# Patient Record
Sex: MALE | Race: WHITE | NOT HISPANIC OR LATINO | Employment: OTHER | URBAN - METROPOLITAN AREA
[De-identification: names, ages, dates, MRNs, and addresses within clinical notes are randomized per-mention and may not be internally consistent; named-entity substitution may affect disease eponyms.]

---

## 2017-02-02 ENCOUNTER — OFFICE VISIT (OUTPATIENT)
Dept: FAMILY MEDICINE | Facility: OTHER | Age: 22
End: 2017-02-02
Attending: NURSE PRACTITIONER
Payer: COMMERCIAL

## 2017-02-02 VITALS
RESPIRATION RATE: 14 BRPM | WEIGHT: 158 LBS | BODY MASS INDEX: 19.87 KG/M2 | HEART RATE: 72 BPM | TEMPERATURE: 98.2 F | SYSTOLIC BLOOD PRESSURE: 98 MMHG | DIASTOLIC BLOOD PRESSURE: 58 MMHG

## 2017-02-02 DIAGNOSIS — J03.90 TONSILLITIS: ICD-10-CM

## 2017-02-02 DIAGNOSIS — R07.0 THROAT PAIN: Primary | ICD-10-CM

## 2017-02-02 LAB
DEPRECATED S PYO AG THROAT QL EIA: NORMAL
MICRO REPORT STATUS: NORMAL
SPECIMEN SOURCE: NORMAL

## 2017-02-02 PROCEDURE — 87081 CULTURE SCREEN ONLY: CPT | Performed by: NURSE PRACTITIONER

## 2017-02-02 PROCEDURE — 99213 OFFICE O/P EST LOW 20 MIN: CPT | Performed by: NURSE PRACTITIONER

## 2017-02-02 PROCEDURE — 87880 STREP A ASSAY W/OPTIC: CPT | Performed by: NURSE PRACTITIONER

## 2017-02-02 RX ORDER — AZITHROMYCIN 250 MG/1
TABLET, FILM COATED ORAL
Qty: 6 TABLET | Refills: 0 | Status: SHIPPED | OUTPATIENT
Start: 2017-02-02 | End: 2018-06-08

## 2017-02-02 NOTE — MR AVS SNAPSHOT
"              After Visit Summary   2/2/2017    Kulwant Santillan    MRN: [id number]           Patient Information     Date Of Birth          1995        Visit Information        Provider Department      2/2/2017 9:30 AM Marina Stuart NP [facility] ([facility]        Today's Diagnoses     Throat pain    -  1     Tonsillitis           Care Instructions      ASSESSMENT/PLAN:  1. Throat pain  symptomatic  - Rapid strep screen - negative  - Beta strep group A culture - pending    2. Tonsillitis  symptomatic  - azithromycin (ZITHROMAX) 250 MG tablet; Two tablets first day, then one tablet daily for four days.  Dispense: 6 tablet; Refill: 0      Use acetaminophen, ibuprofen, Robitussin DM, claritin or zyrtec   Increase fluids and rest.   Follow up if symptoms do not improve or worsen    Marina Stuart,   Certified Adult Nurse Practitioner  [phone]            Follow-ups after your visit        Who to contact     If you have questions or need follow up information about today's clinic visit or your schedule please contact [facility] directly at [phone].  Normal or non-critical lab and imaging results will be communicated to you by [employer], letter or phone within 4 business days after the clinic has received the results. If you do not hear from us within 7 days, please contact the clinic through [employer] or phone. If you have a critical or abnormal lab result, we will notify you by phone as soon as possible.  Submit refill requests through [employer] or call your pharmacy and they will forward the refill request to us. Please allow 3 business days for your refill to be completed.          Additional Information About Your Visit        MyChart Information     [employer] lets you send messages to your doctor, view your test results, renew your prescriptions, schedule appointments and more. To sign up, go to [email or web address] . Click on \"Log in\" on the left side of the screen, which " "will take you to the Welcome page. Then click on \"Sign up Now\" on the right side of the page.     You will be asked to enter the access code listed below, as well as some personal information. Please follow the directions to create your username and password.     Your access code is: [email or web address]  Expires: 5/3/2017  9:38 AM     Your access code will  in 90 days. If you need help or a new code, please call your [facility] or [phone].        Care EveryWhere ID     This is your Care EveryWhere ID. This could be used by other organizations to access your [place] medical records  [id number]        Your Vitals Were     Pulse Temperature Respirations             72 98.2  F (36.8  C) 14          Blood Pressure from Last 3 Encounters:   17 98/58   16 82/53   02/20/15 110/70    Weight from Last 3 Encounters:   17 158 lb (71.668 kg)   16 160 lb (72.576 kg) (57.59 %*)   02/20/15 145 lb (65.772 kg) (41.50 %*)     * Growth percentiles are based on [facility] 2-20 Years data.              We Performed the Following     Beta strep group A culture     Rapid strep screen          Today's Medication Changes          These changes are accurate as of: 17  9:38 AM.  If you have any questions, ask your nurse or doctor.               Start taking these medicines.        Dose/Directions    azithromycin 250 MG tablet   Commonly known as:  ZITHROMAX   Used for:  Tonsillitis   Started by:  Marina Stuart NP        Two tablets first day, then one tablet daily for four days.   Quantity:  6 tablet   Refills:  0            Where to get your medicines      These medications were sent to [id number] - [place], [place] - [address] AT [facility] OF  [address], [place] [address]     Phone:  [phone]    - azithromycin 250 MG tablet             Primary Care Provider Office Phone # Fax #    Marina Stuart [phone]       " [facility] [address] [place] [address]        Thank you!     Thank you for choosing [facility]  for your care. Our goal is always to provide you with excellent care. Hearing back from our patients is one way we can continue to improve our services. Please take a few minutes to complete the written survey that you may receive in the mail after your visit with us. Thank you!             Your Updated Medication List - Protect others around you: Learn how to safely use, store and throw away your medicines at www.disposemymeds.org.          This list is accurate as of: 2/2/17  9:38 AM.  Always use your most recent med list.                   Brand Name Dispense Instructions for use    azithromycin 250 MG tablet    ZITHROMAX    6 tablet    Two tablets first day, then one tablet daily for four days.       doxycycline 100 MG tablet    VIBRA-TABS    180 tablet    Take 1 tablet (100 mg) by mouth 2 times daily       ibuprofen 200 MG tablet    ADVIL/MOTRIN     Take 200 mg by mouth every 6 hours as needed

## 2017-02-02 NOTE — PATIENT INSTRUCTIONS
ASSESSMENT/PLAN:  1. Throat pain  symptomatic  - Rapid strep screen - negative  - Beta strep group A culture - pending    2. Tonsillitis  symptomatic  - azithromycin (ZITHROMAX) 250 MG tablet; Two tablets first day, then one tablet daily for four days.  Dispense: 6 tablet; Refill: 0      Use acetaminophen, ibuprofen, Robitussin DM, claritin or zyrtec   Increase fluids and rest.   Follow up if symptoms do not improve or worsen    Marina Stuart,   Certified Adult Nurse Practitioner  [phone]

## 2017-02-02 NOTE — PROGRESS NOTES
CHIEF COMPLAINT:  Chief Complaint   Patient presents with     Sinus Problem     SINUS PRESSURE, POST NASAL DRAINAGE, SORE THROAT, EARS HURT FOR 5 DAYS       SUBJECTIVE:   Kulwant Santillan  is here today because of:Sore Throat  The patient has had symptoms of cough, earache, sore throat, nasal congestion/runny nose, facial pressure, chest congestion and myalgias.   Onset of symptoms was 5 days ago. Course of illness is worsening.  Patient admits to exposure to illness at home or work/school.  Dad was started on azithromycin yesterday and is feeling better today.   Patient denies nausea, vomiting and diarrhea  Treatment measures tried include decongestants, cough medication - no relief.  .  Patient is a smoker - cutting down with goal of quitting          History reviewed. No pertinent past medical history.  Past Surgical History   Procedure Laterality Date     Circumcision       Orthopedic surgery  9-2013     rt knee, open, tendon repair     Current Outpatient Prescriptions   Medication Sig Dispense Refill     ibuprofen (ADVIL,MOTRIN) 200 MG tablet Take 200 mg by mouth every 6 hours as needed        doxycycline (VIBRA-TABS) 100 MG tablet Take 1 tablet (100 mg) by mouth 2 times daily 180 tablet 0      Allergies   Allergen Reactions     Cefprozil      Penicillins      Sulfa Drugs      Sulfonamide antibiotics     Sulfamethoxazole      Trimethoprim      Amoxicillin Trihydrate Rash     Clavulanic Acid Potassium Rash       Family and Social History are reviewed.    REVIEW OF SYSTEMS  Skin: negative  Eyes: negative  Ears/Nose/Throat: as above  Respiratory: cough  Cardiovascular: negative  Gastrointestinal: negative  Genitourinary: negative  Musculoskeletal: myalgia  Neurologic: dizzy  Psychiatric: negative  Hematologic/Lymphatic/Immunologic: negative  Endocrine: negative    OBJECTIVE:   Vital signs:BP 98/58 mmHg  Pulse 72  Temp(Src) 98.2  F (36.8  C)  Resp 14  Wt 158 lb (71.668 kg)   General: alert and no distress  Skin is  unremarkable.  HEENT: bilateral TM fluid noted, neck has bilateral anterior cervical nodes enlarged, tonsils red, enlarged, with exudate present and post nasal drip noted.  Lungs chest clear to IPPA, no tachypnea, retractions or cyanosis and S1, S2 normal, no murmur, no gallop, rate regular  Rapid Strep Test is performed    LABS AND IMAGING  Results for orders placed or performed in visit on 02/02/17   Rapid strep screen   Result Value Ref Range    Specimen Description Throat     Rapid Strep A Screen       NEGATIVE: No Group A streptococcal antigen detected by immunoassay, await   culture report.      Micro Report Status FINAL 02/02/2017        ASSESSMENT/PLAN:  1. Throat pain  symptomatic  - Rapid strep screen - negative  - Beta strep group A culture - pending    2. Tonsillitis  symptomatic  - azithromycin (ZITHROMAX) 250 MG tablet; Two tablets first day, then one tablet daily for four days.  Dispense: 6 tablet; Refill: 0      Use acetaminophen, ibuprofen, Robitussin DM, claritin or zyrtec   Increase fluids and rest.   Follow up if symptoms do not improve or worsen    Marina Stuart,   Certified Adult Nurse Practitioner  [phone]

## 2017-02-02 NOTE — NURSING NOTE
"Chief Complaint   Patient presents with     Sinus Problem     SINUS PRESSURE, POST NASAL DRAINAGE, SORE THROAT, EARS HURT FOR 5 DAYS       Initial BP 98/58 mmHg  Pulse 72  Temp(Src) 98.2  F (36.8  C)  Resp 14  Wt 158 lb (71.668 kg) Estimated body mass index is 19.87 kg/(m^2) as calculated from the following:    Height as of 7/7/16: 6' 2.75\" (1.899 m).    Weight as of this encounter: 158 lb (71.668 kg).  BP completed using cuff size: oscra Rene      "

## 2017-02-04 LAB
BACTERIA SPEC CULT: NORMAL
MICRO REPORT STATUS: NORMAL
SPECIMEN SOURCE: NORMAL

## 2017-08-24 ENCOUNTER — TELEPHONE (OUTPATIENT)
Dept: FAMILY MEDICINE | Facility: OTHER | Age: 22
End: 2017-08-24

## 2017-08-24 DIAGNOSIS — M25.60 JOINT STIFFNESS: ICD-10-CM

## 2017-08-24 DIAGNOSIS — M25.50 PAIN IN JOINT, MULTIPLE SITES: Primary | ICD-10-CM

## 2017-08-24 NOTE — TELEPHONE ENCOUNTER
Received a call from Janie (mom), stating that pt has an appt on Sept 27, 17 with Dr Mundo Miner Rheumatology and just needs an insurance referral for appt

## 2017-09-12 ENCOUNTER — TRANSFERRED RECORDS (OUTPATIENT)
Dept: HEALTH INFORMATION MANAGEMENT | Facility: HOSPITAL | Age: 22
End: 2017-09-12

## 2017-09-27 ENCOUNTER — TRANSFERRED RECORDS (OUTPATIENT)
Dept: HEALTH INFORMATION MANAGEMENT | Facility: HOSPITAL | Age: 22
End: 2017-09-27

## 2017-10-04 PROBLEM — M24.9 KNEE JOINT HYPERMOBILITY: Status: ACTIVE | Noted: 2017-10-04

## 2017-11-02 ENCOUNTER — TELEPHONE (OUTPATIENT)
Dept: FAMILY MEDICINE | Facility: OTHER | Age: 22
End: 2017-11-02

## 2017-11-02 NOTE — TELEPHONE ENCOUNTER
Left message notifying patient the insurance referral was resubmitted for the appointment on 9/17/17 at [facility] (Dr. Flower, Rheumatologist)

## 2017-11-26 ENCOUNTER — HEALTH MAINTENANCE LETTER (OUTPATIENT)
Age: 22
End: 2017-11-26

## 2018-01-29 ENCOUNTER — TELEPHONE (OUTPATIENT)
Dept: FAMILY MEDICINE | Facility: OTHER | Age: 23
End: 2018-01-29

## 2018-01-29 DIAGNOSIS — M25.562 LEFT KNEE PAIN, UNSPECIFIED CHRONICITY: Primary | ICD-10-CM

## 2018-01-29 NOTE — TELEPHONE ENCOUNTER
Mother left voice mail asking for referral with Dr nugent for his knee wants to see him Friday and was hoping we could put in referral   Pamela M Lechevalier LPN

## 2018-02-02 ENCOUNTER — TRANSFERRED RECORDS (OUTPATIENT)
Dept: HEALTH INFORMATION MANAGEMENT | Facility: HOSPITAL | Age: 23
End: 2018-02-02

## 2018-02-02 ENCOUNTER — RADIANT APPOINTMENT (OUTPATIENT)
Dept: GENERAL RADIOLOGY | Facility: OTHER | Age: 23
End: 2018-02-02
Attending: ORTHOPAEDIC SURGERY
Payer: COMMERCIAL

## 2018-02-02 DIAGNOSIS — M25.562 LEFT KNEE PAIN: ICD-10-CM

## 2018-02-02 PROCEDURE — 73562 X-RAY EXAM OF KNEE 3: CPT | Mod: TC

## 2018-02-28 ENCOUNTER — TELEPHONE (OUTPATIENT)
Dept: FAMILY MEDICINE | Facility: OTHER | Age: 23
End: 2018-02-28

## 2018-02-28 DIAGNOSIS — M25.562 LEFT KNEE PAIN, UNSPECIFIED CHRONICITY: Primary | ICD-10-CM

## 2018-02-28 NOTE — TELEPHONE ENCOUNTER
"Got verbal OK to speak to mother.Pain continues in left knee.Has to keep leg in a bent position.Feels like a tendon is rubbing. OTC mediations gives no relief.Patient and mother do not want to see  again. He did suggest PT. Has been limping around for \"quite a while now\".Recommendations?  "

## 2018-02-28 NOTE — TELEPHONE ENCOUNTER
To: Shoaib Ríos Nurse  Please return call to mom of patient.  His knee is just getting worse.  They did not have good results/relief with the the Rheumatoid doctor BRIDGETT Pedro. Any suggestions you could give her would be greatly appreciated.  Her phone is [phone].  Thank you

## 2018-02-28 NOTE — TELEPHONE ENCOUNTER
Willing to try PT here in [facility] location. Yes- did XRAY left knee.Placed PT order for your review/approval.

## 2018-03-05 ENCOUNTER — TELEPHONE (OUTPATIENT)
Dept: FAMILY MEDICINE | Facility: OTHER | Age: 23
End: 2018-03-05

## 2018-03-05 DIAGNOSIS — M25.362 INSTABILITY OF KNEE JOINT, LEFT: Primary | ICD-10-CM

## 2018-03-05 DIAGNOSIS — M24.9 HYPERMOBILITY OF JOINT: ICD-10-CM

## 2018-03-05 NOTE — TELEPHONE ENCOUNTER
"Patient mother called and patient is willing to try PT.They would like answers and wants a second opinion at the [facility].His joints keep \"popping out and he falls,painfule\". States,\"feel frustrated that no one will do anything\". They are aware PCP out this week and willing to wait for her to return.Refer to [facility]?Please advise.Thank you.                   "

## 2018-03-12 NOTE — TELEPHONE ENCOUNTER
Patient and mother updated on referral below.They are not going to start therapy until seen at [facility].Please note.

## 2018-03-15 NOTE — TELEPHONE ENCOUNTER
APPT INFO    Date /Time: 3/19/18 1:30 PM    Reason for Appt: Instability Left Knee   Ref Provider/Clinic: Marina Stuart NP   Are there internal records? Yes/No?  IF YES, list clinic names: [facility]     Are there outside records? Yes/No? [facility]- Records are scanned in [facility].    Patient Contact (Y/N) & Call Details: No- referral. Records are in Epic.    Action: ---

## 2018-03-19 ENCOUNTER — RADIANT APPOINTMENT (OUTPATIENT)
Dept: GENERAL RADIOLOGY | Facility: CLINIC | Age: 23
End: 2018-03-19
Payer: COMMERCIAL

## 2018-03-19 ENCOUNTER — PRE VISIT (OUTPATIENT)
Dept: ORTHOPEDICS | Facility: CLINIC | Age: 23
End: 2018-03-19

## 2018-03-19 ENCOUNTER — RADIANT APPOINTMENT (OUTPATIENT)
Dept: GENERAL RADIOLOGY | Facility: CLINIC | Age: 23
End: 2018-03-19
Attending: ORTHOPAEDIC SURGERY
Payer: COMMERCIAL

## 2018-03-19 ENCOUNTER — OFFICE VISIT (OUTPATIENT)
Dept: ORTHOPEDICS | Facility: CLINIC | Age: 23
End: 2018-03-19
Payer: COMMERCIAL

## 2018-03-19 VITALS — WEIGHT: 150 LBS | BODY MASS INDEX: 19.25 KG/M2 | HEIGHT: 74 IN

## 2018-03-19 DIAGNOSIS — M25.561 PAIN IN BOTH KNEES, UNSPECIFIED CHRONICITY: ICD-10-CM

## 2018-03-19 DIAGNOSIS — M25.562 PAIN IN BOTH KNEES, UNSPECIFIED CHRONICITY: ICD-10-CM

## 2018-03-19 DIAGNOSIS — M25.561 PAIN IN BOTH KNEES, UNSPECIFIED CHRONICITY: Primary | ICD-10-CM

## 2018-03-19 DIAGNOSIS — M25.562 PAIN IN BOTH KNEES, UNSPECIFIED CHRONICITY: Primary | ICD-10-CM

## 2018-03-19 ASSESSMENT — ENCOUNTER SYMPTOMS
STIFFNESS: 1
WEAKNESS: 1
LOSS OF CONSCIOUSNESS: 0
DIZZINESS: 0
MEMORY LOSS: 0
TINGLING: 1
BACK PAIN: 1
DISTURBANCES IN COORDINATION: 0
MYALGIAS: 1
NUMBNESS: 0
HEADACHES: 1
SEIZURES: 0
TREMORS: 0
JOINT SWELLING: 0
PARALYSIS: 0
ARTHRALGIAS: 1
NECK PAIN: 1
MUSCLE WEAKNESS: 1
MUSCLE CRAMPS: 1
SPEECH CHANGE: 0

## 2018-03-19 NOTE — PROGRESS NOTES
Patient seen and examined with the resident.     Assesment: 23 yo male, bilateral patellar instability, + Bilateral j tracking, s/p right medial imbrication and arthrosocpic lateral release    Plan: MRI of both knees, new radiographs today, PT visit with taping in clinic today, will call with results and a plan.    I agree with history, physical and imaging as well as the assessment and plan as detailed by Dr. Jacobs.       ADDENDUM:  Patient seen in linked PT visit:    Right Knee- Pain improved from 6/10-3/10 with Kam lateral to medial taping and tilt.  Left knee - Less pain (though it was not quantified) and increased ability to do step up with Medial to Lateral Kam Etoile    ADDENDUM2:    MRI reviewed, patient called.    MRI left knee: Large osteochondral defect lateral femoral condyle left knee fragmentation of this osteochondral defect is noted with multiple loose fragments.  I think this is unstable and likely unrepairable.    MRI right knee: Large osteochondral lesion lateral femoral condyle.  Largely intact, no fluid signal suggests instability less fragmentation than on the left side.    Plan: I called the patient and had a discussion with him today.  At this time it would be my recommendation to proceed with the left side first is that is the more symptomatic side.  I would consider performing a knee arthroscopy evaluation of his cartilage defect debridement and planning for the future.  I recognize this will not treat his patellar instability, but I think a staged approach is necessary as he very well may need an osteochondral allograft transplantation to the lateral femoral condyle.  Realistically if he remains symptomatic, I would return to complete this as well as an MPFL reconstruction.  I do not think that he needs tibial tubercle surgery at this time.    Realistically on the right side, we could treat this with a knee arthroscopy screw fixation of his OCD lesion and MP FL reconstruction  I think this realistically could be done in one stage.  However I would encourage him to pursue treatment for the more symptomatic left side first.    He has had a talk this over and let us know if he is interested with surgery.

## 2018-03-19 NOTE — PROGRESS NOTES
CHIEF CONCERN: bilateral knee pain    HISTORY:   Kulwant Santillan is a 22 year old male with multiple joint instability complaints.  He has previously been seen by a provider in [place] and was told nothing further could be done.  He is able to delineate his left knee worse than right and knees are his primary complaint today.  Reports 8 patellofemoral dislocations on the right.  3 dislocations on the left.  Initially started when he was around 13 years of age.  Most recent was left side 2 months prior.  He did have a procedure on his right side details are not known but given incisions likely to have been a medial imbrication and arthroscopic lateral release.  He reports this took place in 2013 and he has been worse off on the right knee since that time.    He has undergone multiple therapy sessions without success.  He has his last MRI from 3 years prior in her system.    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    PAST MEDICAL HISTORY: (Reviewed with the patient and in the medical record)  1. Asthma as a child  2. Multiple areas of joint pain    No past medical history on file.    PAST SURGICAL HISTORY: (Reviewed with the patient and in the medical record)  1. R knee patellar surgery 2013 [facility]    Past Surgical History:   Procedure Laterality Date     CIRCUMCISION       ORTHOPEDIC SURGERY  9-2013    rt knee, open, tendon repair       MEDICATIONS: (Reviewed with the patient and in the medical record)  1. none    Current Outpatient Prescriptions   Medication     azithromycin (ZITHROMAX) 250 MG tablet     ibuprofen (ADVIL,MOTRIN) 200 MG tablet     doxycycline (VIBRA-TABS) 100 MG tablet     No current facility-administered medications for this visit.        1. 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  --Avocation/Sport: bike riding, archery    FAMILY HISTORY: (Reviewed with the patient and in the medical record)    -- No family history of bleeding, clotting, or difficulty with anesthesia    Family History   Problem Relation Age of Onset     CANCER Mother      nasal pharngeal     Thyroid Disease Mother      disease       REVIEW OF SYSTEMS: (Reviewed with the patient and on the health intake form)  -- A comprehensive 10 point review of systems was conducted and is negative except as noted in the HPI    EXAM:     General: Alert and oriented no apparent distress comfortable on room air    Gait: Ambulates into the room with no assist device nonantalgic    Bilateral lower extremity:    Skin intact no rashes or other lesions.  Right knee scar is well-healed    No appreciable effusion on either knee.    Tenderness to palpation about the lateral facet of the patella symmetric    Knee range of motion from 5  hyperextension to 140  flexion both knees.    Stable to varus valgus at 0 and 30  stable Lockman symmetric exam    [Sensation intact to light touch SPD PNT distributions    [5 out of 5 EHL tib ant and gastroc 4 out of 5 quad    [Palpable DP  Right knee patellar exam  With 2 2 quadrant medial slide 2 quadrant, grossly mobile patella.  Lateral slide positive lateral tilt.  Positive J.  Apprehension.  Unable to perform knee flexion with lateral pressure in the supine position.    Left knee patellar exam 2+ quadrant medial and lateral slide negative tilt positive J positive apprehension again and unable to perform knee flexion against gravity with lateral pressure.    MIKI R knee 35, L knee 30    IMAGING:  R knee  IS 1.44, CD 1.6, +  crossing sign    L knee  IS 1.24, CD 1.14, + crossing sign      ASSESSMENT:  1. Bilateral patellar instability    PLAN:    PT visit today    XR bilat knees w 20 deg axial view    Standing alignment film    MRI bilateral knees    EOS study    Call with results    It is likely that we will discuss operative interventions for both knees over the phone when the results of his studies have been reviewed.    Seen and examined with Dr. Matthew Jacobs MD  03/19/2018    Answers for HPI/ROS submitted by the patient on 3/19/2018   General Symptoms: No  Skin Symptoms: No  HENT Symptoms: No  EYE SYMPTOMS: No  HEART SYMPTOMS: No  LUNG SYMPTOMS: No  INTESTINAL SYMPTOMS: No  URINARY SYMPTOMS: No  REPRODUCTIVE SYMPTOMS: No  SKELETAL SYMPTOMS: Yes  BLOOD SYMPTOMS: No  NERVOUS SYSTEM SYMPTOMS: Yes  MENTAL HEALTH SYMPTOMS: No  Back pain: Yes  Muscle aches: Yes  Neck pain: Yes  Swollen joints: No  Joint pain: Yes  Bone pain: No  Muscle cramps: Yes  Muscle weakness: Yes  Joint stiffness: Yes  Bone fracture: No  Trouble with coordination: No  Dizziness or trouble with balance: No  Fainting or black-out spells: No  Memory loss: No  Headache: Yes  Seizures: No  Speech problems: No  Tingling: Yes  Tremor: No  Weakness: Yes  Difficulty walking: Yes  Paralysis: No  Numbness: No

## 2018-03-19 NOTE — MR AVS SNAPSHOT
"              After Visit Summary   3/19/2018    Kulwant Santillan    MRN: [id number]           Patient Information     Date Of Birth          1995        Visit Information        Provider Department      3/19/2018 1:30 PM Hoang Castro MD [facility]        Today's Diagnoses     Pain in both knees, unspecified chronicity    -  1       Follow-ups after your visit        Future tests that were ordered for you today     Open Future Orders        Priority Expected Expires Ordered    MR Knee Right w/o Contrast Routine  3/19/2019 3/19/2018    MR Knee Left w/o Contrast Routine  3/19/2019 3/19/2018            Who to contact     Please call your clinic at [phone] to:    Ask questions about your health    Make or cancel appointments    Discuss your medicines    Learn about your test results    Speak to your doctor            Additional Information About Your Visit        MyChart Information     [employer] is an electronic gateway that provides easy, online access to your medical records. With [employer], you can request a clinic appointment, read your test results, renew a prescription or communicate with your care team.     To sign up for [employer] visit the website at [email or web address]   You will be asked to enter the access code listed below, as well as some personal information. Please follow the directions to create your username and password.     Your access code is: [email or web address]  Expires: 2018  6:30 AM     Your access code will  in 90 days. If you need help or a new code, please contact your [facility] or call [phone] for assistance.        Care EveryWhere ID     This is your Care EveryWhere ID. This could be used by other organizations to access your [place] medical records  [id number]        Your Vitals Were     Height BMI (Body Mass Index)                1.88 m (6' 2\") 19.26 kg/m2           Blood Pressure from Last 3 " Encounters:   02/02/17 98/58   07/07/16 (!) 82/53   02/20/15 110/70    Weight from Last 3 Encounters:   03/19/18 68 kg (150 lb)   02/02/17 71.7 kg (158 lb)   07/07/16 72.6 kg (160 lb) (58 %)*     * Growth percentiles are based on [facility] 2-20 Years data.               Primary Care Provider Office Phone # Fax #    Marina STOVALL CHIP Stuart [phone] [address] [facility] [address]        Equal Access to Services     MARCO [facility]port[facility]ILLIAN : Hadii pastor morales hadashmeagan Soaditya, waaxda luqadaha, qaybta kaalmada ademarjorieyaclaritza, peter yang . [facility] [phone].    ATENCIÓN: Si habla español, tiene a coleman disposición servicios gratuitos de asistencia lingüística. [facility] [phone].    We comply with applicable federal civil rights laws and [place] laws. We do not discriminate on the basis of race, color, national origin, age, disability, sex, sexual orientation, or gender identity.            Thank you!     Thank you for choosing [facility]  for your care. Our goal is always to provide you with excellent care. Hearing back from our patients is one way we can continue to improve our services. Please take a few minutes to complete the written survey that you may receive in the mail after your visit with us. Thank you!             Your Updated Medication List - Protect others around you: Learn how to safely use, store and throw away your medicines at www.disposemymeds.org.          This list is accurate as of 3/19/18  6:02 PM.  Always use your most recent med list.                   Brand Name Dispense Instructions for use Diagnosis    azithromycin 250 MG tablet    ZITHROMAX    6 tablet    Two tablets first day, then one tablet daily for four days.    Tonsillitis       doxycycline 100 MG tablet    VIBRA-TABS    180 tablet    Take 1 tablet (100 mg) by mouth 2 times daily    Cystic acne       ibuprofen 200 MG tablet    ADVIL/MOTRIN     Take 200 mg by mouth  every 6 hours as needed

## 2018-03-19 NOTE — LETTER
3/19/2018       RE: Kulwant Santillan  [facility] [address]     Dear Colleague,    Thank you for referring your patient, Kulwant Santillan, to the [facility] at [facility]. Please see a copy of my visit note below.    CHIEF CONCERN: bilateral knee pain    HISTORY:   Kulwant Santillan is a 22 year old male with multiple joint instability complaints.  He has previously been seen by a provider in [place] and was told nothing further could be done.  He is able to delineate his left knee worse than right and knees are his primary complaint today.  Reports 8 patellofemoral dislocations on the right.  3 dislocations on the left.  Initially started when he was around 13 years of age.  Most recent was left side 2 months prior.  He did have a procedure on his right side details are not known but given incisions likely to have been a medial imbrication and arthroscopic lateral release.  He reports this took place in 2013 and he has been worse off on the right knee since that time.    He has undergone multiple therapy sessions without success.  He has his last MRI from 3 years prior in her system.    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    PAST MEDICAL HISTORY: (Reviewed with the patient and in the medical record)  1. Asthma as a child  2. Multiple areas of joint pain    No past medical history on file.    PAST SURGICAL HISTORY: (Reviewed with the patient and in the medical record)  1. R knee patellar surgery 2013 OSH    Past Surgical History:   Procedure Laterality Date     CIRCUMCISION       ORTHOPEDIC SURGERY  9-2013    rt knee, open, tendon repair       MEDICATIONS: (Reviewed with the patient and in the medical record)  1. none    Current Outpatient Prescriptions   Medication     azithromycin (ZITHROMAX) 250 MG tablet     ibuprofen (ADVIL,MOTRIN) 200 MG tablet      doxycycline (VIBRA-TABS) 100 MG tablet     No current facility-administered medications for this visit.        1. 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  --Avocation/Sport: bike riding, archery    FAMILY HISTORY: (Reviewed with the patient and in the medical record)    -- No family history of bleeding, clotting, or difficulty with anesthesia    Family History   Problem Relation Age of Onset     CANCER Mother      nasal pharngeal     Thyroid Disease Mother      disease       REVIEW OF SYSTEMS: (Reviewed with the patient and on the health intake form)  -- A comprehensive 10 point review of systems was conducted and is negative except as noted in the HPI    EXAM:     General: Alert and oriented no apparent distress comfortable on room air    Gait: Ambulates into the room with no assist device nonantalgic    Bilateral lower extremity:    Skin intact no rashes or other lesions.  Right knee scar is well-healed    No appreciable effusion on either knee.    Tenderness to palpation about the lateral facet of the patella symmetric    Knee range of motion from 5  hyperextension to 140  flexion both knees.    Stable to varus valgus at 0 and 30  stable Lockman symmetric exam    [Sensation intact to light touch SPD PNT distributions    [5 out of 5 EHL tib ant and gastroc 4 out of 5 quad    [Palpable DP  Right knee patellar exam  With 2 2 quadrant medial slide 2 quadrant, grossly mobile patella.  Lateral slide positive lateral tilt.  Positive J.  Apprehension.  Unable to perform knee flexion with lateral pressure in the  supine position.    Left knee patellar exam 2+ quadrant medial and lateral slide negative tilt positive J positive apprehension again and unable to perform knee flexion against gravity with lateral pressure.    MIKI R knee 35, L knee 30    IMAGING:  R knee  IS 1.44, CD 1.6, + crossing sign    L knee  IS 1.24, CD 1.14, + crossing sign      ASSESSMENT:  1. Bilateral patellar instability    PLAN:    PT visit today    XR bilat knees w 20 deg axial view    Standing alignment film    MRI bilateral knees    EOS study    Call with results    It is likely that we will discuss operative interventions for both knees over the phone when the results of his studies have been reviewed.    Seen and examined with Dr. Matthew Jacobs MD  03/19/2018    Answers for HPI/ROS submitted by the patient on 3/19/2018   General Symptoms: No  Skin Symptoms: No  HENT Symptoms: No  EYE SYMPTOMS: No  HEART SYMPTOMS: No  LUNG SYMPTOMS: No  INTESTINAL SYMPTOMS: No  URINARY SYMPTOMS: No  REPRODUCTIVE SYMPTOMS: No  SKELETAL SYMPTOMS: Yes  BLOOD SYMPTOMS: No  NERVOUS SYSTEM SYMPTOMS: Yes  MENTAL HEALTH SYMPTOMS: No  Back pain: Yes  Muscle aches: Yes  Neck pain: Yes  Swollen joints: No  Joint pain: Yes  Bone pain: No  Muscle cramps: Yes  Muscle weakness: Yes  Joint stiffness: Yes  Bone fracture: No  Trouble with coordination: No  Dizziness or trouble with balance: No  Fainting or black-out spells: No  Memory loss: No  Headache: Yes  Seizures: No  Speech problems: No  Tingling: Yes  Tremor: No  Weakness: Yes  Difficulty walking: Yes  Paralysis: No  Numbness: No      Patient seen and examined with the resident.     Assesment: 21 yo male, bilateral patellar instability, + Bilateral j tracking, s/p right medial imbrication and arthrosocpic lateral release    Plan: MRI of both knees, new radiographs today, PT visit with taping in clinic today, will call with results and a plan.    I agree with history, physical and imaging as well as the  assessment and plan as detailed by Dr. Jacobs.       Again, thank you for allowing me to participate in the care of your patient.      Sincerely,    Hoang Castro MD

## 2018-04-02 ENCOUNTER — HOSPITAL ENCOUNTER (OUTPATIENT)
Dept: MRI IMAGING | Facility: HOSPITAL | Age: 23
Discharge: HOME | End: 2018-04-02
Attending: ORTHOPAEDIC SURGERY | Admitting: ORTHOPAEDIC SURGERY
Payer: COMMERCIAL

## 2018-04-02 ENCOUNTER — TELEPHONE (OUTPATIENT)
Dept: ORTHOPEDICS | Facility: CLINIC | Age: 23
End: 2018-04-02

## 2018-04-02 ENCOUNTER — HOSPITAL ENCOUNTER (OUTPATIENT)
Dept: MRI IMAGING | Facility: HOSPITAL | Age: 23
End: 2018-04-02
Attending: ORTHOPAEDIC SURGERY
Payer: COMMERCIAL

## 2018-04-02 DIAGNOSIS — M25.562 PAIN IN BOTH KNEES, UNSPECIFIED CHRONICITY: ICD-10-CM

## 2018-04-02 DIAGNOSIS — M25.561 PAIN IN BOTH KNEES, UNSPECIFIED CHRONICITY: ICD-10-CM

## 2018-04-02 DIAGNOSIS — M25.562 PAIN IN BOTH KNEES, UNSPECIFIED CHRONICITY: Primary | ICD-10-CM

## 2018-04-02 DIAGNOSIS — M25.561 PAIN IN BOTH KNEES, UNSPECIFIED CHRONICITY: Primary | ICD-10-CM

## 2018-04-02 PROCEDURE — 73721 MRI JNT OF LWR EXTRE W/O DYE: CPT | Mod: TC,RT

## 2018-04-02 PROCEDURE — 73721 MRI JNT OF LWR EXTRE W/O DYE: CPT | Mod: TC,LT

## 2018-04-02 NOTE — TELEPHONE ENCOUNTER
Lynette from [facility] called regarding patient's MRI orders that were entered on 3/19/2018.  [facility] needs to have the orders signed by Dr. Castro.  The MRI orders were re-entered as standing orders which will require MD signature.    Florence Newman LPN

## 2018-04-10 ENCOUNTER — TELEPHONE (OUTPATIENT)
Dept: ORTHOPEDICS | Facility: CLINIC | Age: 23
End: 2018-04-10

## 2018-04-10 NOTE — TELEPHONE ENCOUNTER
----- Message from Hoang Castro MD sent at 4/6/2018  2:08 PM CDT -----  I called this patient, no answer.    Please arrange a specific time on Monday April 16 for when I can speak with him.     ANTHONY

## 2018-04-10 NOTE — TELEPHONE ENCOUNTER
Left voicemail for patient, requesting a call back to discuss MRI results and plan of care. Callback number was left.

## 2018-04-11 NOTE — TELEPHONE ENCOUNTER
Returned call to patient's mom, informing her that Dr. Castro is out of town this week and that he will call her and Kulwant on Monday, 4/16/18, afternoon between 2pm-4pm to discuss MRI results. She agreed to this plan and will expect his call.

## 2018-04-16 ENCOUNTER — TELEPHONE (OUTPATIENT)
Dept: ORTHOPEDICS | Facility: CLINIC | Age: 23
End: 2018-04-16

## 2018-04-16 NOTE — TELEPHONE ENCOUNTER
[facility] Call Center    Phone Message    May a detailed message be left on voicemail: yes    Reason for Call: Other: pt mom looking for MRI results on her son for bilat knees  nurse told pt mom that they would be getting a call today at 2pm from someone in sports    Action Taken: Message routed to:  [facility] ([facility]): Orthopedics

## 2018-04-16 NOTE — TELEPHONE ENCOUNTER
Returned call to patient's mom and apologized that Dr. Castro wasn't able to reach them today due a very busy clinic. She was informed that Dr. Castro should be able to call them either tomorrow or Wednesday. Patient's mom seemed frustrated by this, as they have been waiting 2 weeks for the results and the patient took time off of work to hear the results today. They were advised that Dr. Castro would be asked to call them tonight. They agreed to this plan.

## 2018-04-23 ENCOUNTER — TELEPHONE (OUTPATIENT)
Dept: ORTHOPEDICS | Facility: CLINIC | Age: 23
End: 2018-04-23

## 2018-04-25 ENCOUNTER — TELEPHONE (OUTPATIENT)
Dept: ORTHOPEDICS | Facility: CLINIC | Age: 23
End: 2018-04-25

## 2018-04-25 NOTE — TELEPHONE ENCOUNTER
Patient's procedure date has changed from 5/29/18 to 5/30/18 due to OR unavailability on 5/29/18. Patient's mother agreeable to date change.

## 2018-04-25 NOTE — TELEPHONE ENCOUNTER
Received call from patient's mother to schedule surgery for Kulwant. Patient has been scheduled for surgery for 5/29/18 at the [facility] with Dr. Castro. Patient's mother was told that she will receive a call 1-2 days prior to surgery with Kulwant's arrival time. She understands that he will need follow up appointments at 1 week and 6 weeks. She was agreeable to the plan.

## 2018-05-08 ENCOUNTER — TELEPHONE (OUTPATIENT)
Dept: FAMILY MEDICINE | Facility: OTHER | Age: 23
End: 2018-05-08

## 2018-05-08 DIAGNOSIS — M25.562 PAIN IN BOTH KNEES, UNSPECIFIED CHRONICITY: Primary | ICD-10-CM

## 2018-05-08 DIAGNOSIS — M25.561 PAIN IN BOTH KNEES, UNSPECIFIED CHRONICITY: Primary | ICD-10-CM

## 2018-05-08 NOTE — TELEPHONE ENCOUNTER
Call from Monique at the [facility] @ [phone].Patient will have surgery for knee at [facility] and needs referral sent to his insurance by us.. He will be having arthroscopy,chondroplasty and loose body removal with Dr Neo Castro.NPI#[id number].[facility] NPI#[id number]. Need referral for this Ortho gama. Thank you.

## 2018-05-18 ENCOUNTER — TELEPHONE (OUTPATIENT)
Dept: ORTHOPEDICS | Facility: CLINIC | Age: 23
End: 2018-05-18

## 2018-05-18 NOTE — TELEPHONE ENCOUNTER
Received call from patient's mother requesting to reschedule Kulwant's surgery with Dr. Castro from 5/30/18 to 6/22/18. She stated she will double check with Kulwant that the 20th will work for him, and will call back if it does not. She stated she will call to reschedule his pre-op H&P to roughly 2 weeks prior to the new surgery date.

## 2018-06-08 ENCOUNTER — OFFICE VISIT (OUTPATIENT)
Dept: FAMILY MEDICINE | Facility: OTHER | Age: 23
End: 2018-06-08
Attending: NURSE PRACTITIONER
Payer: COMMERCIAL

## 2018-06-08 VITALS
DIASTOLIC BLOOD PRESSURE: 60 MMHG | HEART RATE: 80 BPM | BODY MASS INDEX: 19.51 KG/M2 | HEIGHT: 74 IN | SYSTOLIC BLOOD PRESSURE: 90 MMHG | TEMPERATURE: 98.4 F | WEIGHT: 152 LBS | RESPIRATION RATE: 14 BRPM

## 2018-06-08 DIAGNOSIS — M25.562 CHRONIC PAIN OF LEFT KNEE: ICD-10-CM

## 2018-06-08 DIAGNOSIS — G89.29 CHRONIC PAIN OF LEFT KNEE: ICD-10-CM

## 2018-06-08 DIAGNOSIS — Z71.6 TOBACCO ABUSE COUNSELING: ICD-10-CM

## 2018-06-08 DIAGNOSIS — Z01.818 PREOP GENERAL PHYSICAL EXAM: Primary | ICD-10-CM

## 2018-06-08 LAB
ANION GAP SERPL CALCULATED.3IONS-SCNC: 3 MMOL/L (ref 3–14)
BASOPHILS # BLD AUTO: 0 10E9/L (ref 0–0.2)
BASOPHILS NFR BLD AUTO: 0.6 %
BUN SERPL-MCNC: 14 MG/DL (ref 7–30)
CALCIUM SERPL-MCNC: 9.3 MG/DL (ref 8.5–10.1)
CHLORIDE SERPL-SCNC: 108 MMOL/L (ref 94–109)
CO2 SERPL-SCNC: 31 MMOL/L (ref 20–32)
CREAT SERPL-MCNC: 0.82 MG/DL (ref 0.66–1.25)
DIFFERENTIAL METHOD BLD: NORMAL
EOSINOPHIL # BLD AUTO: 0.3 10E9/L (ref 0–0.7)
EOSINOPHIL NFR BLD AUTO: 3.6 %
ERYTHROCYTE [DISTWIDTH] IN BLOOD BY AUTOMATED COUNT: 12.7 % (ref 10–15)
GFR SERPL CREATININE-BSD FRML MDRD: >90 ML/MIN/1.7M2
GLUCOSE SERPL-MCNC: 87 MG/DL (ref 70–99)
HCT VFR BLD AUTO: 46.1 % (ref 40–53)
HGB BLD-MCNC: 15.8 G/DL (ref 13.3–17.7)
LYMPHOCYTES # BLD AUTO: 2.5 10E9/L (ref 0.8–5.3)
LYMPHOCYTES NFR BLD AUTO: 35 %
MCH RBC QN AUTO: 30.3 PG (ref 26.5–33)
MCHC RBC AUTO-ENTMCNC: 34.3 G/DL (ref 31.5–36.5)
MCV RBC AUTO: 89 FL (ref 78–100)
MONOCYTES # BLD AUTO: 1 10E9/L (ref 0–1.3)
MONOCYTES NFR BLD AUTO: 13.6 %
NEUTROPHILS # BLD AUTO: 3.4 10E9/L (ref 1.6–8.3)
NEUTROPHILS NFR BLD AUTO: 47.2 %
PLATELET # BLD AUTO: 249 10E9/L (ref 150–450)
POTASSIUM SERPL-SCNC: 4.4 MMOL/L (ref 3.4–5.3)
RBC # BLD AUTO: 5.21 10E12/L (ref 4.4–5.9)
SODIUM SERPL-SCNC: 142 MMOL/L (ref 133–144)
WBC # BLD AUTO: 7.2 10E9/L (ref 4–11)

## 2018-06-08 PROCEDURE — 80048 BASIC METABOLIC PNL TOTAL CA: CPT | Performed by: NURSE PRACTITIONER

## 2018-06-08 PROCEDURE — 99214 OFFICE O/P EST MOD 30 MIN: CPT | Performed by: NURSE PRACTITIONER

## 2018-06-08 PROCEDURE — 36415 COLL VENOUS BLD VENIPUNCTURE: CPT | Performed by: NURSE PRACTITIONER

## 2018-06-08 PROCEDURE — 85025 COMPLETE CBC W/AUTO DIFF WBC: CPT | Performed by: NURSE PRACTITIONER

## 2018-06-08 ASSESSMENT — PAIN SCALES - GENERAL: PAINLEVEL: MODERATE PAIN (5)

## 2018-06-08 NOTE — MR AVS SNAPSHOT
After Visit Summary   6/8/2018    Kulwant Santillan    MRN: [id number]           Patient Information     Date Of Birth          1995        Visit Information        Provider Department      6/8/2018 1:30 PM Marina Stuart NP [facility]        Today's Diagnoses     Preop general physical exam    -  1    Chronic pain of left knee        Tobacco abuse counseling           Follow-ups after your visit        Follow-up notes from your care team     Return if symptoms worsen or fail to improve.      Your next 10 appointments already scheduled     Jun 22, 2018   Procedure with Hoang Castro MD   [facility] ([facility])    [address] [place] [address]  5th Floor  [facility] [address] [phone]           Located in the Clinics and Surgery Center at [address] [place] [address].   parking is very convenient and highly recommended.  is a $6 flat rate fee.  Both  and self parkers should enter the main arrival plaza from [facility]; parking attendants will direct you based on your parking preference.              Who to contact     If you have questions or need follow up information about today's clinic visit or your schedule please contact [facility] directly at [phone].  Normal or non-critical lab and imaging results will be communicated to you by MyChart, letter or phone within 4 business days after the clinic has received the results. If you do not hear from us within 7 days, please contact the clinic through MyChart or phone. If you have a critical or abnormal lab result, we will notify you by phone as soon as possible.  Submit refill requests through [employer] or call your pharmacy and they will forward the refill request to us. Please allow 3 business days for your refill to be completed.          Additional Information About Your Visit        MyChart Information      "[employer] gives you secure access to your electronic health record. If you see a primary care provider, you can also send messages to your care team and make appointments. If you have questions, please call your primary care clinic.  If you do not have a primary care provider, please call [phone] and they will assist you.        Care EveryWhere ID     This is your Care EveryWhere ID. This could be used by other organizations to access your [place] medical records  [id number]        Your Vitals Were     Pulse Temperature Respirations Height BMI (Body Mass Index)       80 98.4  F (36.9  C) 14 6' 2\" (1.88 m) 19.52 kg/m2        Blood Pressure from Last 3 Encounters:   06/08/18 90/60   02/02/17 98/58   07/07/16 (!) 82/53    Weight from Last 3 Encounters:   06/08/18 152 lb (68.9 kg)   03/19/18 150 lb (68 kg)   02/02/17 158 lb (71.7 kg)              We Performed the Following     Basic metabolic panel     CBC with platelets differential          Today's Medication Changes          These changes are accurate as of 6/8/18 11:59 PM.  If you have any questions, ask your nurse or doctor.               Stop taking these medicines if you haven't already. Please contact your care team if you have questions.     azithromycin 250 MG tablet   Commonly known as:  ZITHROMAX   Stopped by:  Marina Stuart NP           doxycycline 100 MG tablet   Commonly known as:  VIBRA-TABS   Stopped by:  Marina Stuart NP                    Primary Care Provider Office Phone # Fax #    Marina Stuart [phone] [address] [facility] [address]        Equal Access to Services     MARCO MULLEN AH: Jose E Buck, cierra lovell, qaybta kaalmapeter crum. So [facility] [phone].    ATENCIÓN: Si habla español, tiene a coleman disposición servicios gratuitos de asistencia lingüística. Llame al [phone].    We comply with " applicable federal civil rights laws and [place] laws. We do not discriminate on the basis of race, color, national origin, age, disability, sex, sexual orientation, or gender identity.            Thank you!     Thank you for choosing [facility] ([facility])  for your care. Our goal is always to provide you with excellent care. Hearing back from our patients is one way we can continue to improve our services. Please take a few minutes to complete the written survey that you may receive in the mail after your visit with us. Thank you!             Your Updated Medication List - Protect others around you: Learn how to safely use, store and throw away your medicines at www.disposemymeds.org.          This list is accurate as of 6/8/18 11:59 PM.  Always use your most recent med list.                   Brand Name Dispense Instructions for use Diagnosis    ibuprofen 200 MG tablet    ADVIL/MOTRIN     Take 200 mg by mouth every 6 hours as needed

## 2018-06-08 NOTE — PROGRESS NOTES
[facility]  [address]  [place] [address]  [phone]  Dept: [phone]    PRE-OP EVALUATION:  Today's date: 2018    Kulwant Santillan (: 1995) presents for pre-operative evaluation assessment as requested by Dr. Castro.  He requires evaluation and anesthesia risk assessment prior to undergoing surgery/procedure for treatment of Left knee pain.    Proposed Surgery/ Procedure: Left knee arthroscopy, chondroplasty, loose body removal  Date of Surgery/ Procedure: 2018  Time of Surgery/ Procedure:   Hospital/Surgical Facility: [facility]   Fax number: [phone]  Primary Physician: Marina Stuart  Type of Anesthesia Anticipated: to be determined    Patient has a Health Care Directive or Living Will:  NO    1. NO - Do you have a history of heart attack, stroke, stent, bypass or surgery on an artery in the head, neck, heart or legs?  2. NO - Do you ever have any pain or discomfort in your chest?  3. NO - Do you have a history of  Heart Failure?  4. NO - Are you troubled by shortness of breath when: walking on the level, up a slight hill or at night?  5. NO - Do you currently have a cold, bronchitis or other respiratory infection?  6. NO - Do you have a cough, shortness of breath or wheezing?  7. NO - Do you sometimes get pains in the calves of your legs when you walk?  8. NO - Do you or anyone in your family have previous history of blood clots?  9. NO - Do you or does anyone in your family have a serious bleeding problem such as prolonged bleeding following surgeries or cuts?  10. NO - Have you ever had problems with anemia or been told to take iron pills?  11. NO - Have you had any abnormal blood loss such as black, tarry or bloody stools, or abnormal vaginal bleeding?  12. NO - Have you ever had a blood transfusion?  13. YES - Have you or any of your relatives ever had problems with anesthesia? Difficulty waking   14. NO - Do you have sleep  apnea, excessive snoring or daytime drowsiness?  15. NO - Do you have any prosthetic heart valves?  16. NO - Do you have prosthetic joints?  17. NO - Is there any chance that you may be pregnant?      HPI:     HPI related to upcoming procedure: Long standing left knee pain that is not improving with conservative measures.  The decision has been made to proceed with surgical correction.       He is feeling well and does not have any other concerns today.      MEDICAL HISTORY:     Patient Active Problem List    Diagnosis Date Noted     Knee joint hypermobility 10/04/2017     Priority: Medium     ACP (advance care planning) 07/07/2016     Priority: Medium     Advance Care Planning 7/7/2016: ACP Review of Chart / Resources Provided:  Reviewed chart for advance care plan.  Kulwant Santillan has no plan or code status on file. Discussed available resources and declined information. Confirmed code status reflects current choices pending further ACP discussions.  Confirmed/documented legally designated decision makers.  Added by Priti Pearson 06/19/2014     Priority: Medium      History reviewed. No pertinent past medical history.  Past Surgical History:   Procedure Laterality Date     CIRCUMCISION       ORTHOPEDIC SURGERY  9-2013    rt knee, open, tendon repair     Current Outpatient Prescriptions   Medication Sig Dispense Refill     ibuprofen (ADVIL,MOTRIN) 200 MG tablet Take 200 mg by mouth every 6 hours as needed        OTC products: None, except as noted above, no recent use of OTC ASA, NSAIDS or Steroids and no use of herbal medications or other supplements    Allergies   Allergen Reactions     Cefprozil      Penicillins      Sulfa Drugs      Sulfonamide antibiotics     Sulfamethoxazole      Trimethoprim      Amoxicillin Trihydrate Rash     Clavulanic Acid Potassium Rash      Latex Allergy: NO    Social History   Substance Use Topics     Smoking status: Former Smoker     Smokeless tobacco: Never Used     "  Comment: no passive exposure     Alcohol use No     History   Drug Use No       REVIEW OF SYSTEMS:   CONSTITUTIONAL: NEGATIVE for fever, chills, change in weight  INTEGUMENTARY/SKIN: NEGATIVE for worrisome rashes, moles or lesions  EYES: NEGATIVE for vision changes or irritation  ENT/MOUTH: NEGATIVE for ear, mouth and throat problems  RESP: NEGATIVE for significant cough or SOB  CV: NEGATIVE for chest pain, palpitations or peripheral edema  GI: NEGATIVE for nausea, abdominal pain, heartburn, or change in bowel habits  : NEGATIVE for frequency, dysuria, or hematuria  MUSCULOSKELETAL:bilateral knee pain, left worse than right  NEURO: NEGATIVE for weakness, dizziness or paresthesias  ENDOCRINE: NEGATIVE for temperature intolerance, skin/hair changes  HEME: NEGATIVE for bleeding problems  PSYCHIATRIC: NEGATIVE for changes in mood or affect    EXAM:   BP 90/60 (BP Location: Left arm, Patient Position: Sitting, Cuff Size: Adult Regular)  Pulse 80  Temp 98.4  F (36.9  C)  Resp 14  Ht 6' 2\" (1.88 m)  Wt 152 lb (68.9 kg)  BMI 19.52 kg/m2    GENERAL APPEARANCE: healthy, alert and no distress     EYES: EOMI,  PERRL     HENT: ear canals and TM's normal and nose and mouth without ulcers or lesions     NECK: no adenopathy, no asymmetry, masses, or scars and thyroid normal to palpation     RESP: lungs clear to auscultation - no rales, rhonchi or wheezes     CV: regular rates and rhythm, normal S1 S2, no S3 or S4 and no murmur, click or rub     ABDOMEN:  soft, nontender, no HSM or masses and bowel sounds normal     MS: extremities normal- no gross deformities noted, no evidence of inflammation in joints, FROM in all extremities.     SKIN: no suspicious lesions or rashes     NEURO: Normal strength and tone, sensory exam grossly normal, mentation intact and speech normal     PSYCH: mentation appears normal. and affect normal/bright    DIAGNOSTICS:     Results for orders placed or performed in visit on 06/08/18   CBC with " platelets differential   Result Value Ref Range    WBC 7.2 4.0 - 11.0 10e9/L    RBC Count 5.21 4.4 - 5.9 10e12/L    Hemoglobin 15.8 13.3 - 17.7 g/dL    Hematocrit 46.1 40.0 - 53.0 %    MCV 89 78 - 100 fl    MCH 30.3 26.5 - 33.0 pg    MCHC 34.3 31.5 - 36.5 g/dL    RDW 12.7 10.0 - 15.0 %    Platelet Count 249 150 - 450 10e9/L    Diff Method Automated Method     % Neutrophils 47.2 %    % Lymphocytes 35.0 %    % Monocytes 13.6 %    % Eosinophils 3.6 %    % Basophils 0.6 %    Absolute Neutrophil 3.4 1.6 - 8.3 10e9/L    Absolute Lymphocytes 2.5 0.8 - 5.3 10e9/L    Absolute Monocytes 1.0 0.0 - 1.3 10e9/L    Absolute Eosinophils 0.3 0.0 - 0.7 10e9/L    Absolute Basophils 0.0 0.0 - 0.2 10e9/L   Basic metabolic panel   Result Value Ref Range    Sodium 142 133 - 144 mmol/L    Potassium 4.4 3.4 - 5.3 mmol/L    Chloride 108 94 - 109 mmol/L    Carbon Dioxide 31 20 - 32 mmol/L    Anion Gap 3 3 - 14 mmol/L    Glucose 87 70 - 99 mg/dL    Urea Nitrogen 14 7 - 30 mg/dL    Creatinine 0.82 0.66 - 1.25 mg/dL    GFR Estimate >90 >60 mL/min/1.7m2    GFR Estimate If Black >90 >60 mL/min/1.7m2    Calcium 9.3 8.5 - 10.1 mg/dL         Recent Labs   Lab Test  07/07/16   1136   HGB  16.8   PLT  201   NA  141   POTASSIUM  3.4   CR  0.79      PROCEDURE: MR KNEE LEFT W/O CONTRAST 4/2/2018 2:17 PM     HISTORY: ; Pain in both knees, unspecified chronicity; Pain in both  knees, unspecified chronicity     COMPARISONS: None.     Meds/Dose Given: None.     TECHNIQUE: Sagittal, coronal and axial images with combination of  proton density, T2 and T1-weighted sequences.     FINDINGS: Anterior and posterior cruciate ligaments are intact as are  medial and lateral collateral ligaments and the extensor mechanism.  There is no medial or lateral meniscal tear.     There is a large osteochondral lesion involving the lateral femoral  condyle posteriorly. This is unstable with fluid interposed between  the cartilage and the underlying bone. There is prominent  subchondral  marrow edema and cystic change associated with this. Abnormality  measures at least 1.6 cm in anterior to posterior dimension by 1.7 cm  in width. Loose fragments are seen adjacent to the defect but there is  also a small defects adjacent to the medial meniscus at the level of  the meniscal root.     Medial cartilage is well preserved. Patellofemoral cartilage is well  preserved. Trochlear groove is relatively shallow.          IMPRESSION: Large unstable appearing osteochondral lesion involving  the lateral femoral condyle posteriorly.     TREY JIMENEZ MD    IMPRESSION:   Reason for surgery/procedure: left knee pain  Diagnosis/reason for consult: anesthesia risk assessment     The proposed surgical procedure is considered INTERMEDIATE risk.    REVISED CARDIAC RISK INDEX  The patient has the following serious cardiovascular risks for perioperative complications such as (MI, PE, VFib and 3  AV Block):  No serious cardiac risks  INTERPRETATION: 0 risks: Class I (very low risk - 0.4% complication rate)    The patient has the following additional risks for perioperative complications:  No identified additional risks      ICD-10-CM    1. Preop general physical exam Z01.818 CBC with platelets differential     Basic metabolic panel   2. Chronic pain of left knee M25.562     G89.29    3. Tobacco abuse counseling Z71.6        RECOMMENDATIONS:       Cardiovascular Risk  Performs 4 METs exercise without symptoms (Walk on level ground at 15 minutes per mile (4 miles/hour)) .       Pulmonary Risk  Advised smoking cessation.       --Patient is to HOLD all scheduled medications on the day of surgery EXCEPT for modifications listed below.    APPROVAL GIVEN to proceed with proposed procedure, without further diagnostic evaluation       Signed Electronically by: Marina Stuart NP    Copy of this evaluation report is provided to requesting physician.    [facility] Preop Guidelines    Revised Cardiac Risk  Index

## 2018-06-08 NOTE — NURSING NOTE
"Chief Complaint   Patient presents with     Pre-Op Exam     6-22-18, , U, left knee arthroscopy       Initial BP 90/60 (BP Location: Left arm, Patient Position: Sitting, Cuff Size: Adult Regular)  Pulse 80  Temp 98.4  F (36.9  C)  Resp 14  Ht 6' 2\" (1.88 m)  Wt 152 lb (68.9 kg)  BMI 19.52 kg/m2 Estimated body mass index is 19.52 kg/(m^2) as calculated from the following:    Height as of this encounter: 6' 2\" (1.88 m).    Weight as of this encounter: 152 lb (68.9 kg).  Medication Reconciliation: complete    Kimberlee Rene LPN    "

## 2018-06-22 ENCOUNTER — SURGERY (OUTPATIENT)
Age: 23
End: 2018-06-22

## 2018-06-22 ENCOUNTER — HOSPITAL ENCOUNTER (OUTPATIENT)
Facility: AMBULATORY SURGERY CENTER | Age: 23
End: 2018-06-22
Attending: ORTHOPAEDIC SURGERY
Payer: COMMERCIAL

## 2018-06-22 ENCOUNTER — ANESTHESIA (OUTPATIENT)
Dept: SURGERY | Facility: AMBULATORY SURGERY CENTER | Age: 23
End: 2018-06-22

## 2018-06-22 ENCOUNTER — ANESTHESIA EVENT (OUTPATIENT)
Dept: SURGERY | Facility: AMBULATORY SURGERY CENTER | Age: 23
End: 2018-06-22

## 2018-06-22 VITALS
HEIGHT: 74 IN | TEMPERATURE: 98.1 F | RESPIRATION RATE: 16 BRPM | WEIGHT: 145 LBS | BODY MASS INDEX: 18.61 KG/M2 | SYSTOLIC BLOOD PRESSURE: 107 MMHG | DIASTOLIC BLOOD PRESSURE: 56 MMHG | OXYGEN SATURATION: 99 % | HEART RATE: 61 BPM

## 2018-06-22 DIAGNOSIS — Z98.890 STATUS POST KNEE SURGERY: Primary | ICD-10-CM

## 2018-06-22 RX ORDER — SODIUM CHLORIDE, SODIUM LACTATE, POTASSIUM CHLORIDE, CALCIUM CHLORIDE 600; 310; 30; 20 MG/100ML; MG/100ML; MG/100ML; MG/100ML
INJECTION, SOLUTION INTRAVENOUS CONTINUOUS
Status: DISCONTINUED | OUTPATIENT
Start: 2018-06-22 | End: 2018-06-22 | Stop reason: HOSPADM

## 2018-06-22 RX ORDER — FENTANYL CITRATE 50 UG/ML
INJECTION, SOLUTION INTRAMUSCULAR; INTRAVENOUS PRN
Status: DISCONTINUED | OUTPATIENT
Start: 2018-06-22 | End: 2018-06-22

## 2018-06-22 RX ORDER — ONDANSETRON 2 MG/ML
INJECTION INTRAMUSCULAR; INTRAVENOUS PRN
Status: DISCONTINUED | OUTPATIENT
Start: 2018-06-22 | End: 2018-06-22

## 2018-06-22 RX ORDER — DEXAMETHASONE SODIUM PHOSPHATE 4 MG/ML
INJECTION, SOLUTION INTRA-ARTICULAR; INTRALESIONAL; INTRAMUSCULAR; INTRAVENOUS; SOFT TISSUE PRN
Status: DISCONTINUED | OUTPATIENT
Start: 2018-06-22 | End: 2018-06-22

## 2018-06-22 RX ORDER — ONDANSETRON 4 MG/1
4 TABLET, ORALLY DISINTEGRATING ORAL
Status: DISCONTINUED | OUTPATIENT
Start: 2018-06-22 | End: 2018-06-23 | Stop reason: HOSPADM

## 2018-06-22 RX ORDER — ONDANSETRON 2 MG/ML
4 INJECTION INTRAMUSCULAR; INTRAVENOUS EVERY 30 MIN PRN
Status: DISCONTINUED | OUTPATIENT
Start: 2018-06-22 | End: 2018-06-23 | Stop reason: HOSPADM

## 2018-06-22 RX ORDER — ACETAMINOPHEN 325 MG/1
975 TABLET ORAL ONCE
Status: COMPLETED | OUTPATIENT
Start: 2018-06-22 | End: 2018-06-22

## 2018-06-22 RX ORDER — OXYCODONE HYDROCHLORIDE 5 MG/1
5 TABLET ORAL EVERY 4 HOURS PRN
Qty: 18 TABLET | Refills: 0 | Status: SHIPPED | OUTPATIENT
Start: 2018-06-22 | End: 2020-09-10

## 2018-06-22 RX ORDER — FENTANYL CITRATE 50 UG/ML
25-50 INJECTION, SOLUTION INTRAMUSCULAR; INTRAVENOUS
Status: DISCONTINUED | OUTPATIENT
Start: 2018-06-22 | End: 2018-06-22 | Stop reason: HOSPADM

## 2018-06-22 RX ORDER — ONDANSETRON 4 MG/1
4 TABLET, ORALLY DISINTEGRATING ORAL EVERY 30 MIN PRN
Status: DISCONTINUED | OUTPATIENT
Start: 2018-06-22 | End: 2018-06-23 | Stop reason: HOSPADM

## 2018-06-22 RX ORDER — NALOXONE HYDROCHLORIDE 0.4 MG/ML
.1-.4 INJECTION, SOLUTION INTRAMUSCULAR; INTRAVENOUS; SUBCUTANEOUS
Status: DISCONTINUED | OUTPATIENT
Start: 2018-06-22 | End: 2018-06-23 | Stop reason: HOSPADM

## 2018-06-22 RX ORDER — BUPIVACAINE HYDROCHLORIDE AND EPINEPHRINE 2.5; 5 MG/ML; UG/ML
INJECTION, SOLUTION INFILTRATION; PERINEURAL PRN
Status: DISCONTINUED | OUTPATIENT
Start: 2018-06-22 | End: 2018-06-22 | Stop reason: HOSPADM

## 2018-06-22 RX ORDER — CLINDAMYCIN PHOSPHATE 900 MG/50ML
900 INJECTION, SOLUTION INTRAVENOUS SEE ADMIN INSTRUCTIONS
Status: DISCONTINUED | OUTPATIENT
Start: 2018-06-22 | End: 2018-06-22 | Stop reason: HOSPADM

## 2018-06-22 RX ORDER — CLINDAMYCIN PHOSPHATE 900 MG/50ML
900 INJECTION, SOLUTION INTRAVENOUS
Status: COMPLETED | OUTPATIENT
Start: 2018-06-22 | End: 2018-06-22

## 2018-06-22 RX ORDER — AMOXICILLIN 250 MG
1-2 CAPSULE ORAL 2 TIMES DAILY
Qty: 16 TABLET | Refills: 0 | Status: SHIPPED | OUTPATIENT
Start: 2018-06-22 | End: 2020-09-10

## 2018-06-22 RX ORDER — GABAPENTIN 300 MG/1
300 CAPSULE ORAL ONCE
Status: COMPLETED | OUTPATIENT
Start: 2018-06-22 | End: 2018-06-22

## 2018-06-22 RX ORDER — LIDOCAINE 40 MG/G
CREAM TOPICAL
Status: DISCONTINUED | OUTPATIENT
Start: 2018-06-22 | End: 2018-06-22 | Stop reason: HOSPADM

## 2018-06-22 RX ORDER — KETOROLAC TROMETHAMINE 30 MG/ML
INJECTION, SOLUTION INTRAMUSCULAR; INTRAVENOUS PRN
Status: DISCONTINUED | OUTPATIENT
Start: 2018-06-22 | End: 2018-06-22

## 2018-06-22 RX ORDER — ONDANSETRON 4 MG/1
4-8 TABLET, ORALLY DISINTEGRATING ORAL EVERY 8 HOURS PRN
Qty: 4 TABLET | Refills: 0 | Status: SHIPPED | OUTPATIENT
Start: 2018-06-22 | End: 2020-09-10

## 2018-06-22 RX ORDER — HYDROXYZINE HYDROCHLORIDE 25 MG/1
25 TABLET, FILM COATED ORAL
Status: COMPLETED | OUTPATIENT
Start: 2018-06-22 | End: 2018-06-22

## 2018-06-22 RX ORDER — ACETAMINOPHEN 325 MG/1
650 TABLET ORAL EVERY 4 HOURS
Qty: 80 TABLET | Refills: 0 | Status: SHIPPED | OUTPATIENT
Start: 2018-06-22 | End: 2020-09-10

## 2018-06-22 RX ORDER — PROPOFOL 10 MG/ML
INJECTION, EMULSION INTRAVENOUS CONTINUOUS PRN
Status: DISCONTINUED | OUTPATIENT
Start: 2018-06-22 | End: 2018-06-22

## 2018-06-22 RX ORDER — SODIUM CHLORIDE, SODIUM LACTATE, POTASSIUM CHLORIDE, CALCIUM CHLORIDE 600; 310; 30; 20 MG/100ML; MG/100ML; MG/100ML; MG/100ML
INJECTION, SOLUTION INTRAVENOUS CONTINUOUS
Status: DISCONTINUED | OUTPATIENT
Start: 2018-06-22 | End: 2018-06-23 | Stop reason: HOSPADM

## 2018-06-22 RX ORDER — OXYCODONE HYDROCHLORIDE 5 MG/1
5 TABLET ORAL ONCE
Status: COMPLETED | OUTPATIENT
Start: 2018-06-22 | End: 2018-06-22

## 2018-06-22 RX ORDER — PROPOFOL 10 MG/ML
INJECTION, EMULSION INTRAVENOUS PRN
Status: DISCONTINUED | OUTPATIENT
Start: 2018-06-22 | End: 2018-06-22

## 2018-06-22 RX ORDER — HYDROXYZINE HYDROCHLORIDE 25 MG/1
25 TABLET, FILM COATED ORAL EVERY 6 HOURS PRN
Qty: 30 TABLET | Refills: 0 | Status: SHIPPED | OUTPATIENT
Start: 2018-06-22 | End: 2020-09-10

## 2018-06-22 RX ORDER — LIDOCAINE HYDROCHLORIDE 20 MG/ML
INJECTION, SOLUTION INFILTRATION; PERINEURAL PRN
Status: DISCONTINUED | OUTPATIENT
Start: 2018-06-22 | End: 2018-06-22

## 2018-06-22 RX ORDER — EPHEDRINE SULFATE 50 MG/ML
INJECTION, SOLUTION INTRAMUSCULAR; INTRAVENOUS; SUBCUTANEOUS PRN
Status: DISCONTINUED | OUTPATIENT
Start: 2018-06-22 | End: 2018-06-22

## 2018-06-22 RX ADMIN — FENTANYL CITRATE 50 MCG: 50 INJECTION, SOLUTION INTRAMUSCULAR; INTRAVENOUS at 12:29

## 2018-06-22 RX ADMIN — ACETAMINOPHEN 975 MG: 325 TABLET ORAL at 11:29

## 2018-06-22 RX ADMIN — GABAPENTIN 300 MG: 300 CAPSULE ORAL at 11:30

## 2018-06-22 RX ADMIN — PROPOFOL 200 MCG/KG/MIN: 10 INJECTION, EMULSION INTRAVENOUS at 12:29

## 2018-06-22 RX ADMIN — CLINDAMYCIN PHOSPHATE 900 MG: 900 INJECTION, SOLUTION INTRAVENOUS at 12:37

## 2018-06-22 RX ADMIN — PROPOFOL 200 MG: 10 INJECTION, EMULSION INTRAVENOUS at 12:29

## 2018-06-22 RX ADMIN — LIDOCAINE HYDROCHLORIDE 100 MG: 20 INJECTION, SOLUTION INFILTRATION; PERINEURAL at 12:29

## 2018-06-22 RX ADMIN — OXYCODONE HYDROCHLORIDE 5 MG: 5 TABLET ORAL at 13:54

## 2018-06-22 RX ADMIN — DEXAMETHASONE SODIUM PHOSPHATE 4 MG: 4 INJECTION, SOLUTION INTRA-ARTICULAR; INTRALESIONAL; INTRAMUSCULAR; INTRAVENOUS; SOFT TISSUE at 12:37

## 2018-06-22 RX ADMIN — PROPOFOL 100 MG: 10 INJECTION, EMULSION INTRAVENOUS at 12:45

## 2018-06-22 RX ADMIN — ONDANSETRON 4 MG: 2 INJECTION INTRAMUSCULAR; INTRAVENOUS at 12:37

## 2018-06-22 RX ADMIN — EPHEDRINE SULFATE 10 MG: 50 INJECTION, SOLUTION INTRAMUSCULAR; INTRAVENOUS; SUBCUTANEOUS at 13:30

## 2018-06-22 RX ADMIN — HYDROXYZINE HYDROCHLORIDE 25 MG: 25 TABLET, FILM COATED ORAL at 13:54

## 2018-06-22 RX ADMIN — KETOROLAC TROMETHAMINE 30 MG: 30 INJECTION, SOLUTION INTRAMUSCULAR; INTRAVENOUS at 12:37

## 2018-06-22 RX ADMIN — BUPIVACAINE HYDROCHLORIDE AND EPINEPHRINE 15 ML: 2.5; 5 INJECTION, SOLUTION INFILTRATION; PERINEURAL at 12:48

## 2018-06-22 RX ADMIN — SODIUM CHLORIDE, SODIUM LACTATE, POTASSIUM CHLORIDE, CALCIUM CHLORIDE: 600; 310; 30; 20 INJECTION, SOLUTION INTRAVENOUS at 12:26

## 2018-06-22 RX ADMIN — EPHEDRINE SULFATE 10 MG: 50 INJECTION, SOLUTION INTRAMUSCULAR; INTRAVENOUS; SUBCUTANEOUS at 13:23

## 2018-06-22 NOTE — ANESTHESIA CARE TRANSFER NOTE
Patient: Kulwant Santillan    Procedure(s):  Examination Under Anesthesia Left Knee, Left Knee Arthroscopy, Chondroplasty, Loose Body Removal   - Wound Class: I-Clean    Diagnosis: Chondral Defect  Diagnosis Additional Information: No value filed.    Anesthesia Type:   General, LMA     Note:  Airway :Face Mask  Patient transferred to:PACU  Comments: Report to RN    100%, 97.5, 66, 16, 108/59Handoff Report: Identifed the Patient, Identified the Reponsible Provider, Reviewed the pertinent medical history, Discussed the surgical course, Reviewed Intra-OP anesthesia mangement and issues during anesthesia, Set expectations for post-procedure period and Allowed opportunity for questions and acknowledgement of understanding      Vitals: (Last set prior to Anesthesia Care Transfer)    CRNA VITALS  6/22/2018 1307 - 6/22/2018 1341      6/22/2018             Pulse: 65    Ht Rate: 81    SpO2: 99 %    Resp Rate (observed): 15                Electronically Signed By: BONY Vasquez CRNA  June 22, 2018  1:41 PM

## 2018-06-22 NOTE — IP AVS SNAPSHOT
[facility]    [address] [place] [id number]    Phone:  [phone]    Fax:  [phone]                                       After Visit Summary   6/22/2018    Kulwant Santillan    MRN: [id number]           After Visit Summary Signature Page     I have received my discharge instructions, and my questions have been answered. I have discussed any challenges I see with this plan with the nurse or doctor.    ..........................................................................................................................................  Patient/Patient Representative Signature      ..........................................................................................................................................  Patient Representative Print Name and Relationship to Patient    ..................................................               ................................................  Date                                            Time    ..........................................................................................................................................  Reviewed by Signature/Title    ...................................................              ..............................................  Date                                                            Time

## 2018-06-22 NOTE — ANESTHESIA PREPROCEDURE EVALUATION
Anesthesia Evaluation     . Pt has had prior anesthetic. Type: General    No history of anesthetic complications          ROS/MED HX    ENT/Pulmonary:       Neurologic:  - neg neurologic ROS     Cardiovascular:  - neg cardiovascular ROS       METS/Exercise Tolerance:     Hematologic:  - neg hematologic  ROS       Musculoskeletal:   (+) , , other musculoskeletal- knee joint hypermobility      GI/Hepatic:  - neg GI/hepatic ROS       Renal/Genitourinary:  - ROS Renal section negative       Endo:  - neg endo ROS       Psychiatric:  - neg psychiatric ROS       Infectious Disease:  - neg infectious disease ROS       Malignancy:      - no malignancy   Other:                   Procedure: Procedure(s):  Examination Under Anesthesia Left Knee, Left Knee Arthroscopy, Chondroplasty, Loose Body Removal  (Choice Anesthesia) - Wound Class: I-Clean    HPI: Kulwant Santillan is a 22 year old male who is presenting for above stated procedure.    PMHx/PSHx/ROS:  No past medical history on file.    Past Surgical History:   Procedure Laterality Date     CIRCUMCISION       ORTHOPEDIC SURGERY  9-2013    rt knee, open, tendon repair       ROS as stated above    Soc Hx:   Social History   Substance Use Topics     Smoking status: Former Smoker     Smokeless tobacco: Never Used      Comment: no passive exposure     Alcohol use No       Allergies:   Allergies   Allergen Reactions     Cefprozil      Penicillins      Sulfa Drugs      Sulfonamide antibiotics     Sulfamethoxazole      Trimethoprim      Amoxicillin Trihydrate Rash     Clavulanic Acid Potassium Rash       Meds:     (Not in a hospital admission)    Current Outpatient Prescriptions   Medication Sig Dispense Refill     ibuprofen (ADVIL,MOTRIN) 200 MG tablet Take 200 mg by mouth every 6 hours as needed          Physical Exam:  VS:      , Weight Wt Readings from Last 2 Encounters:   06/08/18 68.9 kg (152 lb)   03/19/18 68 kg (150 lb)       Labs:    BMP:  Recent Labs   Lab Test  06/08/18    1407   NA  142   POTASSIUM  4.4   CHLORIDE  108   CO2  31   BUN  14   CR  0.82   GLC  87   TRISTIN  9.3     LFTs:   Recent Labs   Lab Test  07/07/16   1136   PROTTOTAL  7.8   ALBUMIN  4.1   BILITOTAL  1.1   ALKPHOS  64*   AST  16   ALT  21     CBC:   Recent Labs   Lab Test  06/08/18   1407   WBC  7.2   RBC  5.21   HGB  15.8   HCT  46.1   MCV  89   MCH  30.3   MCHC  34.3   RDW  12.7   PLT  249     Coags:  No results for input(s): INR, PTT, FIBR in the last 47781 hours.      Physical Exam  Normal systems: dental    Airway   Mallampati: I  TM distance: >3 FB  Neck ROM: full    Dental     Cardiovascular   Rhythm and rate: regular and normal      Pulmonary    breath sounds clear to auscultation                    Anesthesia Plan      History & Physical Review  History and physical reviewed and following examination; no interval change.    ASA Status:  2 .    NPO Status:  > 6 hours    Plan for General and LMA with Intravenous and Propofol induction. Maintenance will be TIVA.    PONV prophylaxis:  Ondansetron (or other 5HT-3) and Dexamethasone or Solumedrol       Postoperative Care  Postoperative pain management:  Oral pain medications and Multi-modal analgesia.      Consents  Anesthetic plan, risks, benefits and alternatives discussed with:  Patient..        I have personally discussed the risks and benefits of anesthesia with the patient and patient agrees to proceed.    José Miguel Rodrigez MD  Anesthesiology                    .

## 2018-06-22 NOTE — OP NOTE
PREOPERATIVE DIAGNOSIS:   1. 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 Osteochondritis dissecans, lateral femoral condyle left knee, unrepairable, grossly unstable    PROCEDURE:  1. Examination under anesthesia left knee  2. Left knee arthroscopy  3. Arthroscopic chondroplasty lateral femoral condyle  4. Arthroscopic loose body removal requiring enlargement of the lateral portal    DATE OF SURGERY: 6/22/2018    SURGEON: Hoang Castro MD    ASSISTANT: Jimmy Dawson PA-C.  His assistance was necessary patient positioning, arthroscopic visualization and loose body removal    RESIDENT OR FELLOW: None available    OPERATIVE INDICATIONS: Kulwant Santillan is a pleasant 22 year old male who I saw through my orthopedic clinic with a history, physical, imaging consistent with left knee locking catching and pain.  I reviewed with the patient the risks, benefits, complications, techniques and alternatives to surgery.  We reviewed the expected course of recovery and the potential expected outcomes.  The patient understood both the risks and benefits and desired to proceed despite the risks.    OPERATIVE DETAILS: In the preoperative area the patient's informed consent was reviewed and they desired to proceed.  The left leg was marked and the patient was in agreement.  The patient was taken to the operating room where a timeout was performed and all parties were in agreement.  Preoperative antibiotics were given within 1 hour of the time of incision.  The patient was placed in the supine position and surrendered to LMA anesthesia.  No tourniquet was applied.  Egg crate was placed beneath the well leg and a side post was utilized.  The operative leg was prepped and draped in the usual sterile fashion.     Anterior medial and anterolateral arthroscopic portals were created and a diagnostic arthroscopy was performed with the following findings: No loose bodies in the suprapatellar  pouch, medial patella facet, central patella, lateral patella facet, central trochlea, medial femoral condyle, medial tibial plateau, lateral tibial plateau were absolutely normal.  Massive, grossly unstable, grossly unrepairable 2 x 2 cm circular defect of the lateral femoral condyle was then identified.  It showed at least 5-8 mm of bone loss.  The surrounding cartilage was absolutely normal.  Tibia was normal.  Lateral meniscus normal.  Medial meniscus normal.  ACL PCL normal.  Popliteus tendon normal.    A loose body removal with of the massive OCD lesion was then performed by enlarging the lateral portal and a grasper was used to take it out.  Shaver, ring curette was used to debride the edges to a stable rim.  The post resection dimensions of this full-thickness chondral defect of the lateral femoral condyle including at least 5 mm of subchondral bone measured 20 x 20 mm per    All excess arthroscopic fluid chondral and bony debris was moved to the knee joint motorized suction    Copious irrigation was performed an a layered closure was initiated, sterile dressings were applied and the patient was transferred to the recovery room in stable condition with stable vital signs.    ESTIMATED BLOOD LOSS: 5 mL.    TOURNIQUET TIME: No tourniquet was placed.    COMPLICATIONS: None apparent.    DRAINS: None.    SPECIMENS: None.     POSTOPERATIVE PLAN:  1. Weightbearing as tolerated  2. Range of motion as tolerated  3. Wean from crutches when able  4. No running or impact sports for 6 weeks  5. If remains symptomatic we will proceed with osteochondral allograft transplantation to the lateral femoral condyle

## 2018-06-22 NOTE — ANESTHESIA POSTPROCEDURE EVALUATION
Patient: Kulwant Santillan    Procedure(s):  Examination Under Anesthesia Left Knee, Left Knee Arthroscopy, Chondroplasty, Loose Body Removal   - Wound Class: I-Clean    Diagnosis:Chondral Defect  Diagnosis Additional Information: No value filed.    Anesthesia Type:  General, LMA    Note:  Anesthesia Post Evaluation    Patient location during evaluation: PACU  Patient participation: Able to fully participate in evaluation  Level of consciousness: awake  Pain management: adequate  Airway patency: patent  Cardiovascular status: acceptable  Respiratory status: acceptable  Hydration status: acceptable  PONV: none             Last vitals:  Vitals:    06/22/18 1341 06/22/18 1356 06/22/18 1421   BP: 108/59 107/63 107/56   Pulse: 68 67 61   Resp: 23 17 16   Temp: 36.4  C (97.5  F) 36.7  C (98.1  F) 36.7  C (98.1  F)   SpO2: 100% 100% 99%         Electronically Signed By: José Miguel Rodrigez MD  June 22, 2018  3:55 PM

## 2018-06-22 NOTE — IP AVS SNAPSHOT
MRN:[id number]                      After Visit Summary   6/22/2018    Kulwant Santillan    MRN: [id number]           Thank you!     Thank you for choosing [facility] for your care. Our goal is always to provide you with excellent care. Hearing back from our patients is one way we can continue to improve our services. Please take a few minutes to complete the written survey that you may receive in the mail after you visit with us. Thank you!        Patient Information     Date Of Birth          1995        About your hospital stay     You were admitted on:  June 22, 2018 [facility]    You were discharged on:  June 22, 2018       Who to Call     For medical emergencies, please call 911.  For non-urgent questions about your medical care, please call your primary care provider or clinic, [phone]  For questions related to your surgery, please call your surgery clinic        Attending Provider     Provider Specialty    Hoang Castro MD Orthopedics       Primary Care Provider Office Phone # Fax #    Marinadana Stuart [phone]      After Care Instructions      Diet as Tolerated       Return to diet before surgery, unless instructed otherwise.            Discharge Instructions       Review outpatient procedure discharge instructions with patient as directed by Provider            Follow-up Physical Therapy appointment       Therapy will be coordinate through clinic at your first followup visit. If you know that you are scheduled to start PT, it is ok to start prior to your first postop visit at approximately 1 week.            Ice to affected area       Ice pack to surgical site every 15 minutes per hour for 24 hours            No driving or operating machinery       While taking narcotics. OK to drive when off narcotics and patient is safe to drive.            Notify Provider       For signs and  symptoms of infection: Fever greater than 101, redness, swelling, heat at site, drainage, please call [phone] during daytime hours for urgent issues during nights or weekends call [phone] and ask for the orthopaedic resident on call.    Narcotic prescription refills can not be called and will not be provided on nights or weekends. Please contact my clinic during business hours at [phone] if you will require a refill.            Remove dressing - at 72 hours       Reapply bandaids if needed, ok to leave uncovered. Leave steristrips in place.            Return to clinic       F/u in clinic in 1-2 weeks as previously scheduled. If your followup appointment is not scheduled please call [phone].            Shower       OK to shower postop day 3, ok to get wet, no submerging wounds in a bath or pool.            Weight bearing - As tolerated                 Further instructions from your care team       [facility]  Home Care Following Anesthesia  For 24 hours after surgery:  1. Get plenty of rest.  A responsible adult must stay with you for at least 24 hours after you leave the surgery center.  2. Do not drive or use heavy equipment.  If you have weakness or tingling, don't drive or use heavy equipment until this feeling goes away.   3. Do not drink alcohol.   4. Avoid strenuous or risky activities.  Ask for help when climbing stairs.  5. You may feel lightheaded.  IF so, sit for a few minutes before standing.  Have someone help you get up.   6. If you have nausea (feel sick to your stomach): Drink only clear liquids such as apple juice, ginger ale, broth or 7-Up.  Rest may also help.  Be sure to drink enough fluids.  Move to a regular diet as you feel able.   7. You may have a slight fever.  Call the doctor if your fever is over 100 F (37.7 C) (taken under the tongue) or lasts longer than 24 hours.  8. You may have a dry mouth, a sore throat, muscle aches or  trouble sleeping. These should go away after 24 hours.  9. Do not make important or legal decisions.     Tips for taking pain medications  To get the best pain relief possible, remember these points:    Take pain medications as directed, before pain becomes severe.    Pain medication can upset your stomach: taking it with food may help.    Constipation is a common side effect of pain medication. Drink plenty of  fluids.    Eat foods high in fiber. Take a stool softener if recommended by your doctor or pharmacist.    Do not drink alcohol, drive or operate machinery while taking pain medications.    Ask about other ways to control pain, such as with heat, ice or relaxation.    Tylenol/Acetaminophen Consumption  To help encourage the safe use of acetaminophen, the makers of TYLENOL  have lowered the maximum daily dose for single-ingredient Extra Strength TYLENOL  (acetaminophen) products sold in the [place] from 8 pills per day (4,000 mg) to 6 pills per day (3,000 mg). The dosing interval has also changed from 2 pills every 4-6 hours to 2 pills every 6 hours.    If you feel your pain relief is insufficient, you may take Tylenol/Acetaminophen in addition to your narcotic pain medication.     Be careful not to exceed 3,000 mg of Tylenol/Acetaminophen in a 24 hour period from all sources.    If you are taking extra strength Tylenol/acetaminophen (500 mg), the maximum dose is 6 tablets in 24 hours.    If you are taking regular strength acetaminophen (325 mg), the maximum dose is 9 tablets in 24 hours.    Call a doctor for any of the followin. Signs of infection (fever, growing tenderness at the surgery site, a large amount of drainage or bleeding, severe pain, foul-smelling drainage, redness, swelling).  2. It has been over 8 to 10 hours since surgery and you are still not able to urinate (pass water).  3. Headache for over 24 hours.      Your doctor is:  Dr. Hoang Castro, Orthopaedics: [phone]               Or  "dial [phone] and ask for the resident on call for:  Orthopaedics  For emergency care, call the:  [facility]:  [phone] (TTY for hearing impaired: [phone])    Information about liposomal bupivacaine (Exparel)    What is Liposomal Bupivacaine?    Liposomal Bupivacaine is a numbing medication that can help you manage your pain after surgery.  This medication is similar to \"novacaine,\" which is often used by the dentist.  Liposomal bupivacaine is released slowly and can help control pain for up to 72 hours.    What is the purpose of Liposomal Bupivacaine?    To manage your pain after surgery    To help you sleep better, take deep breaths, walk more comfortable, and feel up to visiting with others    How is the procedure done?    Liposomal bupivacaine is a medication given by an injection.    It is usually given right before your surgery.  If this is the case, you will be awake or sedated, but you should experience minimal pain during the procedure.    For some people, the injection may be given at the very end of your surgery.  It all depends on the type of surgery and your situation.    The procedure usually takes about 5-15 minutes.  An ultrasound machine will help the anesthesiologist insert it in the right place or the surgeon will inject it under direct vision.     A needle is used to place the numbing medication under your skin.  It provides pain relief by numbing the tissue in the area where your surgeon will make the incision.    What can I expect?    You may experience numbness, tingling, or a feeling of heaviness around the area that was injected.    If you experience any of the follow symptoms IMMEDIATELY CALL THE REGIONAL ANESTHESIA PAIN SERVICE:    Numbness or tingling occurs in areas other than around the injection site    Blurry vision    Ringing in your ears    A metallic taste in your mouth    PAGE: Dial [phone].  When prompted, enter the following 4-digit ID " number:  [id number].  You will be prompted to enter your phone number; and then enter the # sign.  The clinician on call will call you back.    OR    CALL: Dial [phone].  Let the hospital  know that you are having a problem with a nerve block and that you would like to speak to the regional anesthesia pain service right away.    You should not receive any other type of numbing medication within 4 days after receiving liposomal bupivacaine unless your anesthesiologist approves.    Post Operative Instructions: Regional Anesthetic for Lower Extremity with Liposomal Bupivacaine  General Information:   Regional anesthesia is when local anesthetic or  numbing  medication is injected around the nerves to anesthetize or  numb  the area supplied by that set of nerves. It is a type of analgesia used to control pain and decreases the need for narcotics following surgery.    Types of Regional Blocks:  Femoral: A block injected into the groin area of the operative leg of a patient having thigh or knee surgery.  Adductor Canal: A block injected into the mid thigh of the operative leg of a patient having knee or ankle surgery.  Popliteal or Distal Sciatic: A block injected into the back of the knee of the operative leg of patients having foot or ankle surgery.   Ankle:  An anesthetic medication is injected into the ankle of the operative leg of a patient having foot or toe surgery.     Procedure:   The type of anesthesia your doctor used to numb your leg will usually not wear off for 24-48 hours, but may last as long as 72 hours. You should be careful during that period, since it is possible to injure your leg without being aware of the injury. While your leg is numb you should:    Use crutches (minimal weight bearing until your motor and strength is completely back to normal)    Avoid striking or bumping your leg    Avoid extreme hot or cold    Discomfort:  You will have a tingling and prickly sensation in your leg as the  "feeling begins to return; you can also expect some discomfort. The amount of discomfort is unpredictable, but if you have more pain than can be controlled with pain medication, you should notify your physician.     Pain Medicine:   Begin taking your oral pain pills before bedtime and during the night to avoid a sudden onset of pain as part of the block wears off. Do not engage in drinking, driving, or hazardous occupations while taking pain medication.     Safety Tips for Using Crutches    Crutch Fit:    Assume good standing posture with shoulders relaxed and crutch tips 6-8 inches out from the side of the foot.    The underarm pad should fall 2-3 fingers width below the armpit.    The handgrip is positioned level with the wrist to allow 30  flexion at the elbow.    Safety Tips:    Bear weight on your hands, not on your armpits.    Do not add extra padding to the underarm pad. This will, in effect, lengthen the crutches and increase risk of nerve injury.    Wear flat, properly fitting shoes. Do not walk in stocking feet, high heels or slippers.    Household hazards:  --Throw rugs should be removed from floors.  --Stairs should be cleared of obstacles.  --Use extra caution on slippery, highly polished, littered or uneven floor surfaces.  --Check for electric cords.    Check crutch tips for excessive wear and keep wing nuts tight.    While walking, look forward with  head up  and  eyes open.  Take equal length steps.    Use BOTH crutches.    Stairs Sequence:    UP: \"Good\" leg first, followed by  bad  leg, then crutches.    DOWN: Crutches, followed by  bad  leg, \"good\" leg.     Walking with Crutches:    Move both crutches forward at the same time.    Non-Weight Bearing (NWB):  Hold the involved leg up and swing through the crutches with the involved leg. The involved leg does not touch the floor.    Toe Touch Weight Bearing (TTWB): Move the involved leg forward. Rest it lightly on the floor for balance only. Step " "through the crutches with the uninvolved leg.    Partial Weight Bearing (PWB): Move the involved leg forward. Step down the weight of the leg only.  Step through the crutches with the uninvolved leg.    Weight Bearing As Tolerated (WBAT): Move the involved leg forward. Put as much pressure through the involved leg as you can tolerate comfortably. Then step through the crutches with the uninvolved leg.                  Pending Results     No orders found from 6/20/2018 to 6/23/2018.            Admission Information     Date & Time Provider Department Dept. Phone    6/22/2018 Hoang Castro MD [facility] [phone]      Your Vitals Were     Blood Pressure Pulse Temperature Respirations Height Weight    108/72 64 98  F (36.7  C) (Oral) 16 1.88 m (6' 2\") 65.8 kg (145 lb)    Pulse Oximetry BMI (Body Mass Index)                100% 18.62 kg/m2          [employer] Information     [employer] gives you secure access to your electronic health record. If you see a primary care provider, you can also send messages to your care team and make appointments. If you have questions, please call your primary care clinic.  If you do not have a primary care provider, please call [phone] and they will assist you.      [employer] is an electronic gateway that provides easy, online access to your medical records. With [employer], you can request a clinic appointment, read your test results, renew a prescription or communicate with your care team.     To access your existing account, please contact your [facility] or call [phone] for assistance.        Care EveryWhere ID     This is your Care EveryWhere ID. This could be used by other organizations to access your [facility] medical records  [id number]        Equal Access to Services     KASH MULLEN : Jose E Buck, cierra lovell, peter marcelo. " So [facility] [phone].    ATENCIÓN: Si bipinla nivia, tiene a coleman disposición servicios gratuitos de asistencia lingüística. Sahra al [phone].    We comply with applicable federal civil rights laws and [place] laws. We do not discriminate on the basis of race, color, national origin, age, disability, sex, sexual orientation, or gender identity.               Review of your medicines      START taking        Dose / Directions    acetaminophen 325 MG tablet   Commonly known as:  TYLENOL   Used for:  Status post knee surgery        Dose:  650 mg   Take 2 tablets (650 mg) by mouth every 4 hours   Quantity:  80 tablet   Refills:  0       hydrOXYzine 25 MG tablet   Commonly known as:  ATARAX   Used for:  Status post knee surgery        Dose:  25 mg   Take 1 tablet (25 mg) by mouth every 6 hours as needed for itching (and nausea)   Quantity:  30 tablet   Refills:  0       ondansetron 4 MG ODT tab   Commonly known as:  ZOFRAN-ODT   Used for:  Status post knee surgery        Dose:  4-8 mg   Take 1-2 tablets (4-8 mg) by mouth every 8 hours as needed for nausea Dissolve ON the tongue.   Quantity:  4 tablet   Refills:  0       oxyCODONE IR 5 MG tablet   Commonly known as:  ROXICODONE   Used for:  Status post knee surgery        Dose:  5 mg   Take 1 tablet (5 mg) by mouth every 4 hours as needed for pain or other (Moderate to Severe)   Quantity:  18 tablet   Refills:  0       senna-docusate 8.6-50 MG per tablet   Commonly known as:  SENOKOT-S;PERICOLACE   Used for:  Status post knee surgery        Dose:  1-2 tablet   Take 1-2 tablets by mouth 2 times daily Take while on oral narcotics to prevent or treat constipation.   Quantity:  16 tablet   Refills:  0         CONTINUE these medicines which have NOT CHANGED        Dose / Directions    ibuprofen 200 MG tablet   Commonly known as:  ADVIL/MOTRIN        Dose:  200 mg   Take 200 mg by mouth every 6 hours as needed   Refills:  0            Where to get your medicines      These  medications were sent to [place], [place] - [address]  [address], [facility] [address]    Hours:  TRANSPLANT PHONE NUMBER [phone] Phone:  [phone]     acetaminophen 325 MG tablet    hydrOXYzine 25 MG tablet    ondansetron 4 MG ODT tab    senna-docusate 8.6-50 MG per tablet         Some of these will need a paper prescription and others can be bought over the counter. Ask your nurse if you have questions.     Bring a paper prescription for each of these medications     oxyCODONE IR 5 MG tablet                Protect others around you: Learn how to safely use, store and throw away your medicines at www.disposemymeds.org.        Information about OPIOIDS     PRESCRIPTION OPIOIDS: WHAT YOU NEED TO KNOW   We gave you an opioid (narcotic) pain medicine. It is important to manage your pain, but opioids are not always the best choice. You should first try all the other options your care team gave you. Take this medicine for as short a time (and as few doses) as possible.     These medicines have risks:    DO NOT drive when on new or higher doses of pain medicine. These medicines can affect your alertness and reaction times, and you could be arrested for driving under the influence (DUI). If you need to use opioids long-term, talk to your care team about driving.    DO NOT operate heave machinery    DO NOT do any other dangerous activities while taking these medicines.     DO NOT drink any alcohol while taking these medicines.      If the opioid prescribed includes acetaminophen, DO NOT take with any other medicines that contain acetaminophen. Read all labels carefully. Look for the word  acetaminophen  or  Tylenol.  Ask your pharmacist if you have questions or are unsure.    You can get addicted to pain medicines, especially if you have a history of addiction (chemical, alcohol or substance dependence). Talk to your care team about ways to  reduce this risk.    Store your pills in a secure place, locked if possible. We will not replace any lost or stolen medicine. If you don t finish your medicine, please throw away (dispose) as directed by your pharmacist. The [place] [employer]ution [employer] has more information about safe disposal: https://www.pca.[facility].[place].us/living-green/managing-unwanted-medications.     All opioids tend to cause constipation. Drink plenty of water and eat foods that have a lot of fiber, such as fruits, vegetables, prune juice, apple juice and high-fiber cereal. Take a laxative (Miralax, milk of magnesia, Colace, Senna) if you don t move your bowels at least every other day.              Medication List: This is a list of all your medications and when to take them. Check marks below indicate your daily home schedule. Keep this list as a reference.      Medications           Morning Afternoon Evening Bedtime As Needed    acetaminophen 325 MG tablet   Commonly known as:  TYLENOL   Take 2 tablets (650 mg) by mouth every 4 hours   Last time this was given:  975 mg on 6/22/2018 11:29 AM                                hydrOXYzine 25 MG tablet   Commonly known as:  ATARAX   Take 1 tablet (25 mg) by mouth every 6 hours as needed for itching (and nausea)                                ibuprofen 200 MG tablet   Commonly known as:  ADVIL/MOTRIN   Take 200 mg by mouth every 6 hours as needed                                ondansetron 4 MG ODT tab   Commonly known as:  ZOFRAN-ODT   Take 1-2 tablets (4-8 mg) by mouth every 8 hours as needed for nausea Dissolve ON the tongue.                                oxyCODONE IR 5 MG tablet   Commonly known as:  ROXICODONE   Take 1 tablet (5 mg) by mouth every 4 hours as needed for pain or other (Moderate to Severe)                                senna-docusate 8.6-50 MG per tablet   Commonly known as:  SENOKOT-S;PERICOLACE   Take 1-2 tablets by mouth 2 times daily Take while on oral narcotics to  prevent or treat constipation.

## 2018-06-22 NOTE — DISCHARGE INSTRUCTIONS
[facility]  Home Care Following Anesthesia  For 24 hours after surgery:  1. Get plenty of rest.  A responsible adult must stay with you for at least 24 hours after you leave the surgery center.  2. Do not drive or use heavy equipment.  If you have weakness or tingling, don't drive or use heavy equipment until this feeling goes away.   3. Do not drink alcohol.   4. Avoid strenuous or risky activities.  Ask for help when climbing stairs.  5. You may feel lightheaded.  IF so, sit for a few minutes before standing.  Have someone help you get up.   6. If you have nausea (feel sick to your stomach): Drink only clear liquids such as apple juice, ginger ale, broth or 7-Up.  Rest may also help.  Be sure to drink enough fluids.  Move to a regular diet as you feel able.   7. You may have a slight fever.  Call the doctor if your fever is over 100 F (37.7 C) (taken under the tongue) or lasts longer than 24 hours.  8. You may have a dry mouth, a sore throat, muscle aches or trouble sleeping. These should go away after 24 hours.  9. Do not make important or legal decisions.     Tips for taking pain medications  To get the best pain relief possible, remember these points:    Take pain medications as directed, before pain becomes severe.    Pain medication can upset your stomach: taking it with food may help.    Constipation is a common side effect of pain medication. Drink plenty of  fluids.    Eat foods high in fiber. Take a stool softener if recommended by your doctor or pharmacist.    Do not drink alcohol, drive or operate machinery while taking pain medications.    Ask about other ways to control pain, such as with heat, ice or relaxation.    Tylenol/Acetaminophen Consumption  To help encourage the safe use of acetaminophen, the makers of TYLENOL  have lowered the maximum daily dose for single-ingredient Extra Strength TYLENOL  (acetaminophen) products sold in the [place] from 8 pills per day  "(4,000 mg) to 6 pills per day (3,000 mg). The dosing interval has also changed from 2 pills every 4-6 hours to 2 pills every 6 hours.    If you feel your pain relief is insufficient, you may take Tylenol/Acetaminophen in addition to your narcotic pain medication.     Be careful not to exceed 3,000 mg of Tylenol/Acetaminophen in a 24 hour period from all sources.    If you are taking extra strength Tylenol/acetaminophen (500 mg), the maximum dose is 6 tablets in 24 hours.    If you are taking regular strength acetaminophen (325 mg), the maximum dose is 9 tablets in 24 hours.    Call a doctor for any of the followin. Signs of infection (fever, growing tenderness at the surgery site, a large amount of drainage or bleeding, severe pain, foul-smelling drainage, redness, swelling).  2. It has been over 8 to 10 hours since surgery and you are still not able to urinate (pass water).  3. Headache for over 24 hours.      Your doctor is:  Dr. Hoang Castro, Orthopaedics: [phone]               Or dial [phone] and ask for the resident on call for:  Orthopaedics  For emergency care, call the:  [facility]:  [phone] (TTY for hearing impaired: [phone])    Information about liposomal bupivacaine (Exparel)    What is Liposomal Bupivacaine?    Liposomal Bupivacaine is a numbing medication that can help you manage your pain after surgery.  This medication is similar to \"novacaine,\" which is often used by the dentist.  Liposomal bupivacaine is released slowly and can help control pain for up to 72 hours.    What is the purpose of Liposomal Bupivacaine?    To manage your pain after surgery    To help you sleep better, take deep breaths, walk more comfortable, and feel up to visiting with others    How is the procedure done?    Liposomal bupivacaine is a medication given by an injection.    It is usually given right before your surgery.  If this is the case, you will be awake or sedated, but " you should experience minimal pain during the procedure.    For some people, the injection may be given at the very end of your surgery.  It all depends on the type of surgery and your situation.    The procedure usually takes about 5-15 minutes.  An ultrasound machine will help the anesthesiologist insert it in the right place or the surgeon will inject it under direct vision.     A needle is used to place the numbing medication under your skin.  It provides pain relief by numbing the tissue in the area where your surgeon will make the incision.    What can I expect?    You may experience numbness, tingling, or a feeling of heaviness around the area that was injected.    If you experience any of the follow symptoms IMMEDIATELY CALL THE REGIONAL ANESTHESIA PAIN SERVICE:    Numbness or tingling occurs in areas other than around the injection site    Blurry vision    Ringing in your ears    A metallic taste in your mouth    PAGE: Dial [phone].  When prompted, enter the following 4-digit ID number:  [id number].  You will be prompted to enter your phone number; and then enter the # sign.  The clinician on call will call you back.    OR    CALL: Dial [phone].  Let the hospital  know that you are having a problem with a nerve block and that you would like to speak to the regional anesthesia pain service right away.    You should not receive any other type of numbing medication within 4 days after receiving liposomal bupivacaine unless your anesthesiologist approves.    Post Operative Instructions: Regional Anesthetic for Lower Extremity with Liposomal Bupivacaine  General Information:   Regional anesthesia is when local anesthetic or  numbing  medication is injected around the nerves to anesthetize or  numb  the area supplied by that set of nerves. It is a type of analgesia used to control pain and decreases the need for narcotics following surgery.    Types of Regional Blocks:  Femoral: A block injected into  the groin area of the operative leg of a patient having thigh or knee surgery.  Adductor Canal: A block injected into the mid thigh of the operative leg of a patient having knee or ankle surgery.  Popliteal or Distal Sciatic: A block injected into the back of the knee of the operative leg of patients having foot or ankle surgery.   Ankle:  An anesthetic medication is injected into the ankle of the operative leg of a patient having foot or toe surgery.     Procedure:   The type of anesthesia your doctor used to numb your leg will usually not wear off for 24-48 hours, but may last as long as 72 hours. You should be careful during that period, since it is possible to injure your leg without being aware of the injury. While your leg is numb you should:    Use crutches (minimal weight bearing until your motor and strength is completely back to normal)    Avoid striking or bumping your leg    Avoid extreme hot or cold    Discomfort:  You will have a tingling and prickly sensation in your leg as the feeling begins to return; you can also expect some discomfort. The amount of discomfort is unpredictable, but if you have more pain than can be controlled with pain medication, you should notify your physician.     Pain Medicine:   Begin taking your oral pain pills before bedtime and during the night to avoid a sudden onset of pain as part of the block wears off. Do not engage in drinking, driving, or hazardous occupations while taking pain medication.     Safety Tips for Using Crutches    Crutch Fit:    Assume good standing posture with shoulders relaxed and crutch tips 6-8 inches out from the side of the foot.    The underarm pad should fall 2-3 fingers width below the armpit.    The handgrip is positioned level with the wrist to allow 30  flexion at the elbow.    Safety Tips:    Bear weight on your hands, not on your armpits.    Do not add extra padding to the underarm pad. This will, in effect, lengthen the crutches and  "increase risk of nerve injury.    Wear flat, properly fitting shoes. Do not walk in stocking feet, high heels or slippers.    Household hazards:  --Throw rugs should be removed from floors.  --Stairs should be cleared of obstacles.  --Use extra caution on slippery, highly polished, littered or uneven floor surfaces.  --Check for electric cords.    Check crutch tips for excessive wear and keep wing nuts tight.    While walking, look forward with  head up  and  eyes open.  Take equal length steps.    Use BOTH crutches.    Stairs Sequence:    UP: \"Good\" leg first, followed by  bad  leg, then crutches.    DOWN: Crutches, followed by  bad  leg, \"good\" leg.     Walking with Crutches:    Move both crutches forward at the same time.    Non-Weight Bearing (NWB):  Hold the involved leg up and swing through the crutches with the involved leg. The involved leg does not touch the floor.    Toe Touch Weight Bearing (TTWB): Move the involved leg forward. Rest it lightly on the floor for balance only. Step through the crutches with the uninvolved leg.    Partial Weight Bearing (PWB): Move the involved leg forward. Step down the weight of the leg only.  Step through the crutches with the uninvolved leg.    Weight Bearing As Tolerated (WBAT): Move the involved leg forward. Put as much pressure through the involved leg as you can tolerate comfortably. Then step through the crutches with the uninvolved leg.                "

## 2018-06-28 NOTE — PROGRESS NOTES
SUBJECTIVE:   Kulwant Santillan is a 22 year old male who presents to clinic today for the following health issues:      Concern - Surgical Follow up of knee need for suture removal   Onset: 6/22/18 surgery date , 7 DAYS    Description:   LEFT knee arthroscopy    Intensity: 6/10    Progression of Symptoms:  Same worse in evening    Accompanying Signs & Symptoms:  none    Previous history of similar problem:   1st on left similar of right knee    Precipitating factors:   Worsened by: walking    Alleviating factors:  Improved by: crutches elevations    Therapies Tried and outcome: Home with oxycodone-has a few left and does not need any additional      Problem list and histories reviewed & adjusted, as indicated.  Additional history: as documented    Patient Active Problem List   Diagnosis     Acne     ACP (advance care planning)     Knee joint hypermobility     Past Surgical History:   Procedure Laterality Date     ARTHROSCOPY KNEE WITH DEBRIDEMENT JOINT, COMBINED Left 6/22/2018    Procedure: ARTHROSCOPY KNEE WITH DEBRIDEMENT JOINT;  Examination Under Anesthesia Left Knee, Left Knee Arthroscopy, Chondroplasty, Loose Body Removal  ;  Surgeon: Hoang Castro MD;  Location:  OR     CIRCUMCISION       ORTHOPEDIC SURGERY  9-2013    rt knee, open, tendon repair       Social History   Substance Use Topics     Smoking status: Former Smoker     Smokeless tobacco: Never Used      Comment: no passive exposure     Alcohol use No     Family History   Problem Relation Age of Onset     Cancer Mother      nasal pharngeal     Thyroid Disease Mother      disease         Current Outpatient Prescriptions   Medication Sig Dispense Refill     acetaminophen (TYLENOL) 325 MG tablet Take 2 tablets (650 mg) by mouth every 4 hours 80 tablet 0     hydrOXYzine (ATARAX) 25 MG tablet Take 1 tablet (25 mg) by mouth every 6 hours as needed for itching (and nausea) 30 tablet 0     ibuprofen (ADVIL,MOTRIN) 200 MG tablet Take 200 mg by  "mouth every 6 hours as needed        ondansetron (ZOFRAN-ODT) 4 MG ODT tab Take 1-2 tablets (4-8 mg) by mouth every 8 hours as needed for nausea Dissolve ON the tongue. 4 tablet 0     oxyCODONE IR (ROXICODONE) 5 MG tablet Take 1 tablet (5 mg) by mouth every 4 hours as needed for pain or other (Moderate to Severe) 18 tablet 0     senna-docusate (SENOKOT-S;PERICOLACE) 8.6-50 MG per tablet Take 1-2 tablets by mouth 2 times daily Take while on oral narcotics to prevent or treat constipation. 16 tablet 0     Allergies   Allergen Reactions     Cefprozil      Penicillins      Sulfa Drugs      Sulfonamide antibiotics     Sulfamethoxazole      Trimethoprim      Amoxicillin Trihydrate Rash     Clavulanic Acid Potassium Rash     Recent Labs   Lab Test  06/08/18   1407  07/07/16   1136   ALT   --   21   CR  0.82  0.79   GFRESTIMATED  >90  >90  Non African American GFR Calc     GFRESTBLACK  >90  >90  African American GFR Calc     POTASSIUM  4.4  3.4   TSH   --   1.97      BP Readings from Last 3 Encounters:   06/29/18 100/60   06/22/18 107/56   06/08/18 90/60    Wt Readings from Last 3 Encounters:   06/29/18 145 lb (65.8 kg)   06/22/18 145 lb (65.8 kg)   06/08/18 152 lb (68.9 kg)                    Reviewed and updated as needed this visit by clinical staff       Reviewed and updated as needed this visit by Provider         ROS:  Constitutional, HEENT, cardiovascular, pulmonary, gi and gu systems are negative, except as otherwise noted.    OBJECTIVE:     /60  Pulse 98  Temp 98.6  F (37  C) (Tympanic)  Resp 16  Ht 6' 2\" (1.88 m)  Wt 145 lb (65.8 kg)  SpO2 99%  BMI 18.62 kg/m2  Body mass index is 18.62 kg/(m^2).  GENERAL: healthy, alert and no distress  SKIN: left knee, 2 sutures - both are easily removed.  Incision is healing well and does not have any signs of infection.  No edema or redness noted.  PSYCH: mentation appears normal, affect normal/bright        ASSESSMENT/PLAN:       1. Encounter for removal of " sutures  Sutures removed, tolerated well  Follow-up with ortho as scheduled.        Marina Stuart NP  [facility]

## 2018-06-29 ENCOUNTER — OFFICE VISIT (OUTPATIENT)
Dept: FAMILY MEDICINE | Facility: OTHER | Age: 23
End: 2018-06-29
Attending: NURSE PRACTITIONER
Payer: COMMERCIAL

## 2018-06-29 VITALS
RESPIRATION RATE: 16 BRPM | SYSTOLIC BLOOD PRESSURE: 100 MMHG | HEIGHT: 74 IN | HEART RATE: 98 BPM | BODY MASS INDEX: 18.61 KG/M2 | DIASTOLIC BLOOD PRESSURE: 60 MMHG | WEIGHT: 145 LBS | TEMPERATURE: 98.6 F | OXYGEN SATURATION: 99 %

## 2018-06-29 DIAGNOSIS — Z48.02 ENCOUNTER FOR REMOVAL OF SUTURES: Primary | ICD-10-CM

## 2018-06-29 PROCEDURE — 99024 POSTOP FOLLOW-UP VISIT: CPT | Performed by: NURSE PRACTITIONER

## 2018-06-29 ASSESSMENT — ANXIETY QUESTIONNAIRES
5. BEING SO RESTLESS THAT IT IS HARD TO SIT STILL: NOT AT ALL
IF YOU CHECKED OFF ANY PROBLEMS ON THIS QUESTIONNAIRE, HOW DIFFICULT HAVE THESE PROBLEMS MADE IT FOR YOU TO DO YOUR WORK, TAKE CARE OF THINGS AT HOME, OR GET ALONG WITH OTHER PEOPLE: NOT DIFFICULT AT ALL
GAD7 TOTAL SCORE: 0
6. BECOMING EASILY ANNOYED OR IRRITABLE: NOT AT ALL
4. TROUBLE RELAXING: NOT AT ALL
2. NOT BEING ABLE TO STOP OR CONTROL WORRYING: NOT AT ALL
3. WORRYING TOO MUCH ABOUT DIFFERENT THINGS: NOT AT ALL
1. FEELING NERVOUS, ANXIOUS, OR ON EDGE: NOT AT ALL
7. FEELING AFRAID AS IF SOMETHING AWFUL MIGHT HAPPEN: NOT AT ALL

## 2018-06-29 ASSESSMENT — PAIN SCALES - GENERAL: PAINLEVEL: SEVERE PAIN (7)

## 2018-06-29 NOTE — NURSING NOTE
"Chief Complaint   Patient presents with     Surgical Followup     suture removal       Initial /60  Pulse 98  Temp 98.6  F (37  C) (Tympanic)  Resp 16  Ht 6' 2\" (1.88 m)  Wt 145 lb (65.8 kg)  SpO2 99%  BMI 18.62 kg/m2 Estimated body mass index is 18.62 kg/(m^2) as calculated from the following:    Height as of this encounter: 6' 2\" (1.88 m).    Weight as of this encounter: 145 lb (65.8 kg).  Medication Reconciliation: complete    Keshia Massey LPN    "

## 2018-06-29 NOTE — MR AVS SNAPSHOT
After Visit Summary   6/29/2018    Kulwant Santillan    MRN: [id number]           Patient Information     Date Of Birth          1995        Visit Information        Provider Department      6/29/2018 1:15 PM Marina Stuart NP [facility]        Today's Diagnoses     Encounter for removal of sutures    -  1       Follow-ups after your visit        Your next 10 appointments already scheduled     Aug 06, 2018 12:30 PM CDT   (Arrive by 12:15 PM)   Return Visit with Hoang Castro MD   [facility] ([facility])    [address] [place] [address]  [address] [facility] [address] [phone]              Who to contact     If you have questions or need follow up information about today's clinic visit or your schedule please contact [facility] directly at [phone].  Normal or non-critical lab and imaging results will be communicated to you by MyChart, letter or phone within 4 business days after the clinic has received the results. If you do not hear from us within 7 days, please contact the clinic through MyChart or phone. If you have a critical or abnormal lab result, we will notify you by phone as soon as possible.  Submit refill requests through "[employer]" or call your pharmacy and they will forward the refill request to us. Please allow 3 business days for your refill to be completed.          Additional Information About Your Visit        MyChart Information     "[employer]" gives you secure access to your electronic health record. If you see a primary care provider, you can also send messages to your care team and make appointments. If you have questions, please call your primary care clinic.  If you do not have a primary care provider, please call [phone] and they will assist you.        Care EveryWhere ID     This is your Care EveryWhere ID. This could be used by other organizations to access your [facility]  "records  [id number]        Your Vitals Were     Pulse Temperature Respirations Height Pulse Oximetry BMI (Body Mass Index)    98 98.6  F (37  C) (Tympanic) 16 6' 2\" (1.88 m) 99% 18.62 kg/m2       Blood Pressure from Last 3 Encounters:   06/29/18 100/60   06/22/18 107/56   06/08/18 90/60    Weight from Last 3 Encounters:   06/29/18 145 lb (65.8 kg)   06/22/18 145 lb (65.8 kg)   06/08/18 152 lb (68.9 kg)              Today, you had the following     No orders found for display       Primary Care Provider Office Phone # Fax #    Marina MAHONEYBebeto Stuart [phone] [facility] [address]        Equal Access to Services     [facility] Center[facility] : Hadii pastor morales hadasho Soomaali, waaxda luqadaha, qaybta kaalmada adeegyada, peter yang . So [facility] [phone].    ATENCIÓN: Si habla español, tiene a coleman disposición servicios gratuitos de asistencia lingüística. Sahra al [phone].    We comply with applicable federal civil rights laws and [place] laws. We do not discriminate on the basis of race, color, national origin, age, disability, sex, sexual orientation, or gender identity.            Thank you!     Thank you for choosing [facility]  for your care. Our goal is always to provide you with excellent care. Hearing back from our patients is one way we can continue to improve our services. Please take a few minutes to complete the written survey that you may receive in the mail after your visit with us. Thank you!             Your Updated Medication List - Protect others around you: Learn how to safely use, store and throw away your medicines at www.disposemymeds.org.          This list is accurate as of 6/29/18 11:59 PM.  Always use your most recent med list.                   Brand Name Dispense Instructions for use Diagnosis    acetaminophen 325 MG tablet    TYLENOL    80 tablet    Take 2 tablets (650 mg) by mouth every 4 hours "    Status post knee surgery       hydrOXYzine 25 MG tablet    ATARAX    30 tablet    Take 1 tablet (25 mg) by mouth every 6 hours as needed for itching (and nausea)    Status post knee surgery       ibuprofen 200 MG tablet    ADVIL/MOTRIN     Take 200 mg by mouth every 6 hours as needed        ondansetron 4 MG ODT tab    ZOFRAN-ODT    4 tablet    Take 1-2 tablets (4-8 mg) by mouth every 8 hours as needed for nausea Dissolve ON the tongue.    Status post knee surgery       oxyCODONE IR 5 MG tablet    ROXICODONE    18 tablet    Take 1 tablet (5 mg) by mouth every 4 hours as needed for pain or other (Moderate to Severe)    Status post knee surgery       senna-docusate 8.6-50 MG per tablet    SENOKOT-S;PERICOLACE    16 tablet    Take 1-2 tablets by mouth 2 times daily Take while on oral narcotics to prevent or treat constipation.    Status post knee surgery

## 2018-06-30 ASSESSMENT — PATIENT HEALTH QUESTIONNAIRE - PHQ9: SUM OF ALL RESPONSES TO PHQ QUESTIONS 1-9: 0

## 2018-06-30 ASSESSMENT — ANXIETY QUESTIONNAIRES: GAD7 TOTAL SCORE: 0

## 2018-08-06 ENCOUNTER — OFFICE VISIT (OUTPATIENT)
Dept: ORTHOPEDICS | Facility: CLINIC | Age: 23
End: 2018-08-06
Payer: COMMERCIAL

## 2018-08-06 DIAGNOSIS — M25.561 PAIN IN BOTH KNEES, UNSPECIFIED CHRONICITY: Primary | ICD-10-CM

## 2018-08-06 DIAGNOSIS — M25.562 PAIN IN BOTH KNEES, UNSPECIFIED CHRONICITY: Primary | ICD-10-CM

## 2018-08-06 ASSESSMENT — ENCOUNTER SYMPTOMS
NECK PAIN: 0
MUSCLE WEAKNESS: 0
ARTHRALGIAS: 1
MUSCLE CRAMPS: 0
STIFFNESS: 1
BACK PAIN: 0
JOINT SWELLING: 0
MYALGIAS: 0

## 2018-08-06 NOTE — PROGRESS NOTES
Patient seen and examined with the resident.     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  He would like to continue to observe to see how he does and I think this is reasonable.  He would like to address the left knee in totality before proceeding onto the right side.  The treatment of the left knee is an osteochondral allograft transplantation.  He is a standing off to proceed with this at his convenience.    I will plan to see him again in an as-needed basis going forward if he would like to have a discussion regarding osteochondral allograft transportation he can call my clinic and we talked me via my chart.    Realistically on the right knee would plan to proceed with a similar approach.    He did have some symptoms of patellar instability and we may need to consider patellar stabilization at the timing of definitive cartilage reconstruction.    I agree with history, physical and imaging as well as the assessment and plan as detailed by Dr. Cheek.

## 2018-08-06 NOTE — LETTER
8/6/2018       RE: Kulwant Santillan  [address] [address]     Dear Colleague,    Thank you for referring your patient, Kulwant Santillan, [facility]. Please see a copy of my visit note below.    DIAGNOSIS:   1. Bilateral lateral femoral condyle OCD    PROCEDURES:  1. Left knee arthroscopy, chondroplasty, loose body removal and OCD debridement. 6/22/18    HISTORY:  22, male who is now 6 weeks as possible C above. Overall he is doing well. He is much better than he was before surgery. His catching and locking symptoms are improved. Still has some mild aching in the lateral joint line. No other concerns. Here with his mother today.    EXAM:     General: Awake, Alert, and oriented. Articulates and communicates with a normal affect     Left Lower Extremity:    Incisions well healed without evidence of infection    No effusion    Range of motion 0-140    Neurovascularly intact    IMAGING:  No new imaging. Did review arthroscopic images with the patient and mother.    ASSESSMENT:  1. 22, male, bilateral lateral femoral condyle OCD. 6 weeks status post left knee arthroscopy and removal of osteochondral fragments    PLAN:   At this point we reviewed his arthroscopic images and his previous MRIs. We discussed his diagnosis. We discussed plans going forward should his symptoms warrant. This would include a left knee osteochondral allograft. He would likely need a staging arthroscopy of the right knee as well. At this point he wants to take care of the left knee going forward. His symptoms are minimal and seemingly continuing to improve. At this point we are going to observe his symptoms and see him back to discuss surgery in the future if needed.    Seen with Dr. Hoang Castro.      Answers for HPI/ROS submitted by the patient on 8/6/2018   General Symptoms: No  Skin Symptoms: No  HENT Symptoms: No  EYE SYMPTOMS: No  HEART SYMPTOMS: No  LUNG  SYMPTOMS: No  INTESTINAL SYMPTOMS: No  URINARY SYMPTOMS: No  REPRODUCTIVE SYMPTOMS: No  SKELETAL SYMPTOMS: Yes  BLOOD SYMPTOMS: No  NERVOUS SYSTEM SYMPTOMS: No  MENTAL HEALTH SYMPTOMS: No  Back pain: No  Muscle aches: No  Neck pain: No  Swollen joints: No  Joint pain: Yes  Bone pain: No  Muscle cramps: No  Muscle weakness: No  Joint stiffness: Yes  Bone fracture: No      Patient seen and examined with the resident.     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  He would like to continue to observe to see how he does and I think this is reasonable.  He would like to address the left knee in totality before proceeding onto the right side.  The treatment of the left knee is an osteochondral allograft transplantation.  He is a standing off to proceed with this at his convenience.    I will plan to see him again in an as-needed basis going forward if he would like to have a discussion regarding osteochondral allograft transportation he can call my clinic and we talked me via my chart.    Realistically on the right knee would plan to proceed with a similar approach.    He did have some symptoms of patellar instability and we may need to consider patellar stabilization at the timing of definitive cartilage reconstruction.    I agree with history, physical and imaging as well as the assessment and plan as detailed by Dr. Cheek.       Again, thank you for allowing me to participate in the care of your patient.      Sincerely,    Hoang Castro MD

## 2018-08-06 NOTE — PROGRESS NOTES
DIAGNOSIS:   1. Bilateral lateral femoral condyle OCD    PROCEDURES:  1. Left knee arthroscopy, chondroplasty, loose body removal and OCD debridement. 6/22/18    HISTORY:  22, male who is now 6 weeks as possible C above. Overall he is doing well. He is much better than he was before surgery. His catching and locking symptoms are improved. Still has some mild aching in the lateral joint line. No other concerns. Here with his mother today.    EXAM:     General: Awake, Alert, and oriented. Articulates and communicates with a normal affect     Left Lower Extremity:    Incisions well healed without evidence of infection    No effusion    Range of motion 0-140    Neurovascularly intact    IMAGING:  No new imaging. Did review arthroscopic images with the patient and mother.    ASSESSMENT:  1. 22, male, bilateral lateral femoral condyle OCD. 6 weeks status post left knee arthroscopy and removal of osteochondral fragments    PLAN:   At this point we reviewed his arthroscopic images and his previous MRIs. We discussed his diagnosis. We discussed plans going forward should his symptoms warrant. This would include a left knee osteochondral allograft. He would likely need a staging arthroscopy of the right knee as well. At this point he wants to take care of the left knee going forward. His symptoms are minimal and seemingly continuing to improve. At this point we are going to observe his symptoms and see him back to discuss surgery in the future if needed.    Seen with Dr. Hoang Castro.      Answers for HPI/ROS submitted by the patient on 8/6/2018   General Symptoms: No  Skin Symptoms: No  HENT Symptoms: No  EYE SYMPTOMS: No  HEART SYMPTOMS: No  LUNG SYMPTOMS: No  INTESTINAL SYMPTOMS: No  URINARY SYMPTOMS: No  REPRODUCTIVE SYMPTOMS: No  SKELETAL SYMPTOMS: Yes  BLOOD SYMPTOMS: No  NERVOUS SYSTEM SYMPTOMS: No  MENTAL HEALTH SYMPTOMS: No  Back pain: No  Muscle aches: No  Neck pain: No  Swollen joints: No  Joint pain: Yes  Bone  pain: No  Muscle cramps: No  Muscle weakness: No  Joint stiffness: Yes  Bone fracture: No

## 2018-08-06 NOTE — MR AVS SNAPSHOT
After Visit Summary   8/6/2018    Kulwant Santillan    MRN: [id number]           Patient Information     Date Of Birth          1995        Visit Information        Provider Department      8/6/2018 12:30 PM Hoang Castro [facility]        Today's Diagnoses     Pain in both knees, unspecified chronicity    -  1       Follow-ups after your visit        Who to contact     Please call your clinic at [phone] to:    Ask questions about your health    Make or cancel appointments    Discuss your medicines    Learn about your test results    Speak to your doctor            Additional Information About Your Visit        MyChart Information     [employer] gives you secure access to your electronic health record. If you see a primary care provider, you can also send messages to your care team and make appointments. If you have questions, please call your primary care clinic.  If you do not have a primary care provider, please call [phone] and they will assist you.      [employer] is an electronic gateway that provides easy, online access to your medical records. With [employer], you can request a clinic appointment, read your test results, renew a prescription or communicate with your care team.     To access your existing account, please contact your [facility] or call [phone] for assistance.        Care EveryWhere ID     This is your Care EveryWhere ID. This could be used by other organizations to access your [facility] medical records  [id number]         Blood Pressure from Last 3 Encounters:   06/29/18 100/60   06/22/18 107/56   06/08/18 90/60    Weight from Last 3 Encounters:   06/29/18 65.8 kg (145 lb)   06/22/18 65.8 kg (145 lb)   06/08/18 68.9 kg (152 lb)              Today, you had the following     No orders found for display       Primary Care Provider Office Phone # Fax #    Marina Stuart [phone]        [address] [facility] [address]        Equal Access to Services     BJORNMARCO PAZ : Hadii aad ku hadmariellemeagan Rosalieaditya, wafarhanda nas, qaruthannta yonathanlutherclaritza schmitt, waxay idiin haykirstincosme barraganyiselbuster hanson. So [facility] [phone].    ATENCIÓN: Si habla español, tiene a coleman disposición servicios gratuitos de asistencia lingüística. [facility] [phone].    We comply with applicable federal civil rights laws and [place] laws. We do not discriminate on the basis of race, color, national origin, age, disability, sex, sexual orientation, or gender identity.            Thank you!     Thank you for choosing [facility]  for your care. Our goal is always to provide you with excellent care. Hearing back from our patients is one way we can continue to improve our services. Please take a few minutes to complete the written survey that you may receive in the mail after your visit with us. Thank you!             Your Updated Medication List - Protect others around you: Learn how to safely use, store and throw away your medicines at www.disposemymeds.org.          This list is accurate as of 8/6/18  1:12 PM.  Always use your most recent med list.                   Brand Name Dispense Instructions for use Diagnosis    acetaminophen 325 MG tablet    TYLENOL    80 tablet    Take 2 tablets (650 mg) by mouth every 4 hours    Status post knee surgery       hydrOXYzine 25 MG tablet    ATARAX    30 tablet    Take 1 tablet (25 mg) by mouth every 6 hours as needed for itching (and nausea)    Status post knee surgery       ibuprofen 200 MG tablet    ADVIL/MOTRIN     Take 200 mg by mouth every 6 hours as needed        ondansetron 4 MG ODT tab    ZOFRAN-ODT    4 tablet    Take 1-2 tablets (4-8 mg) by mouth every 8 hours as needed for nausea Dissolve ON the tongue.    Status post knee surgery       oxyCODONE IR 5 MG tablet    ROXICODONE    18 tablet    Take 1 tablet (5 mg) by mouth every 4 hours as needed for  pain or other (Moderate to Severe)    Status post knee surgery       senna-docusate 8.6-50 MG per tablet    SENOKOT-S;PERICOLACE    16 tablet    Take 1-2 tablets by mouth 2 times daily Take while on oral narcotics to prevent or treat constipation.    Status post knee surgery

## 2019-03-26 ENCOUNTER — TELEPHONE (OUTPATIENT)
Dept: FAMILY MEDICINE | Facility: OTHER | Age: 24
End: 2019-03-26

## 2019-03-26 NOTE — TELEPHONE ENCOUNTER
Left detailed message per OK of mother. Updated on PCP recommendation.No current appt openings.Advised UC for DX of influenza.

## 2019-03-26 NOTE — TELEPHONE ENCOUNTER
GENERIC ADULT  Kulwant Santillan is a 23 year old male.Mother calling for Tamiflu.Pt developed chills,fever,body aches and vomitted lat night.Sore throat.She is requesting Tamiflu be sen to [facility].      Allergies:   Allergies   Allergen Reactions     Cefprozil      Penicillins      Sulfa Drugs      Sulfonamide antibiotics     Sulfamethoxazole      Trimethoprim      Amoxicillin Trihydrate Rash     Clavulanic Acid Potassium Rash         RECOMMENDED DISPOSITION:  Please advise    Call mother back,Janie and OK to leave a message.    Jaqueline Morris RN

## 2020-03-02 ENCOUNTER — HEALTH MAINTENANCE LETTER (OUTPATIENT)
Age: 25
End: 2020-03-02

## 2020-06-08 ENCOUNTER — TELEPHONE (OUTPATIENT)
Dept: FAMILY MEDICINE | Facility: OTHER | Age: 25
End: 2020-06-08

## 2020-06-08 DIAGNOSIS — M24.9 KNEE JOINT HYPERMOBILITY: Primary | ICD-10-CM

## 2020-06-08 NOTE — TELEPHONE ENCOUNTER
12:16 PM    Reason for Call: Phone Call    Description: Janie, mother would like the nurse to call her about some referrals that are needed . Please call    Was an appointment offered for this call? No    Preferred method for responding to this message: [phone] / Janie    If we cannot reach you directly, may we leave a detailed response at the number you provided?  Yes        Katerina Ching

## 2020-07-20 DIAGNOSIS — M25.561 PAIN IN BOTH KNEES, UNSPECIFIED CHRONICITY: Primary | ICD-10-CM

## 2020-07-20 DIAGNOSIS — M25.562 PAIN IN BOTH KNEES, UNSPECIFIED CHRONICITY: Primary | ICD-10-CM

## 2020-07-27 ENCOUNTER — OFFICE VISIT (OUTPATIENT)
Dept: ORTHOPEDICS | Facility: CLINIC | Age: 25
End: 2020-07-27
Attending: NURSE PRACTITIONER
Payer: COMMERCIAL

## 2020-07-27 ENCOUNTER — ANCILLARY PROCEDURE (OUTPATIENT)
Dept: GENERAL RADIOLOGY | Facility: CLINIC | Age: 25
End: 2020-07-27
Attending: ORTHOPAEDIC SURGERY
Payer: COMMERCIAL

## 2020-07-27 VITALS — WEIGHT: 144 LBS | HEIGHT: 75 IN | BODY MASS INDEX: 17.91 KG/M2

## 2020-07-27 DIAGNOSIS — M25.562 PAIN IN BOTH KNEES, UNSPECIFIED CHRONICITY: ICD-10-CM

## 2020-07-27 DIAGNOSIS — M24.9 KNEE JOINT HYPERMOBILITY: ICD-10-CM

## 2020-07-27 DIAGNOSIS — M25.561 PAIN IN BOTH KNEES, UNSPECIFIED CHRONICITY: ICD-10-CM

## 2020-07-27 ASSESSMENT — MIFFLIN-ST. JEOR: SCORE: 1736.93

## 2020-07-27 NOTE — PROGRESS NOTES
Kulwant Santillan returns to clinic today for interval follow-up he is really here for his right knee today.  Approximately 2 years ago we diagnosed him with an unstable and unrepairable OCD lesion and I performed a knee arthroscopy and loose body removal of his left knee he describes that his left knee is now his good knee really does not bother him nor does it hurt him.  It does not keep him from doing what he wants to do.  He denies any limitations.  I previously offered him osteochondral allograft transplantation for this knee he has not really felt like he needed it and he overall reports very good function in his left knee 2 years later.    He states that his right knee feels like his left knee did prior to the surgery.    MRI from 2 years ago showed what appeared to be an unstable OCD lesion    Examination today shows no effusion on his left knee well-healed surgical portals full range of motion stable ligaments.  Examination of his right knee today shows no significant effusion full range of motion ligaments stable    Images obtained today show evidence of an OCD lesion on plain films and neutral alignment on standing alignment films    MRI from a couple years ago shows a potentially unstable OCD lesion to the lateral femoral condyle of his right knee.    Clinical assessment: Left knee OCD lesion status post fragment excision.  Doing well.     Right knee OCD lesion with pain and mechanical symptoms    Plan: I had a long discussion with the patient at this time I reiterated to him that he has a standing offer to proceed with osteochondral allograft transplantation to his left lateral femoral condyle at any time he desires.  He is currently not symptomatic so I do not think we have to do this.    In regards to his right knee we are going to get an MRI of his right knee we will then plan to have a virtual or phone visit with him to discuss the results he lives very far distance away it is my guess that he will be a  candidate for arthroscopic loose body removal as we performed the left side.    We did discuss in short the possibility of osteochondral allograft transplantation at the same time however I think we will need further clarity from the imaging the patient needs to completely stop smoking prior to this.

## 2020-07-27 NOTE — NURSING NOTE
"Reason For Visit:   Chief Complaint   Patient presents with     RECHECK     followup left knee instability // s/p left knee scope and chondroplasty DOS 6/22/18 // feels the same on the right knee // here for right knee        Ht 1.91 m (6' 3.2\")   Wt 65.3 kg (144 lb)   BMI 17.90 kg/m      Pain Assessment  Patient Currently in Pain: Yes  0-10 Pain Scale: 4  Primary Pain Location: Knee(right)    Nivia Calderon ATC    "

## 2020-07-27 NOTE — LETTER
7/27/2020         RE: Kulwant Santillan  [facility] [facility] [address]        Dear Colleague,    Thank you for referring your patient, Kulwant Santillan, to the [facility]. Please see a copy of my visit note below.    Kulwant Santillan returns to clinic today for interval follow-up he is really here for his right knee today.  Approximately 2 years ago we diagnosed him with an unstable and unrepairable OCD lesion and I performed a knee arthroscopy and loose body removal of his left knee he describes that his left knee is now his good knee really does not bother him nor does it hurt him.  It does not keep him from doing what he wants to do.  He denies any limitations.  I previously offered him osteochondral allograft transplantation for this knee he has not really felt like he needed it and he overall reports very good function in his left knee 2 years later.    He states that his right knee feels like his left knee did prior to the surgery.    MRI from 2 years ago showed what appeared to be an unstable OCD lesion    Examination today shows no effusion on his left knee well-healed surgical portals full range of motion stable ligaments.  Examination of his right knee today shows no significant effusion full range of motion ligaments stable    Images obtained today show evidence of an OCD lesion on plain films and neutral alignment on standing alignment films    MRI from a couple years ago shows a potentially unstable OCD lesion to the lateral femoral condyle of his right knee.    Clinical assessment: Left knee OCD lesion status post fragment excision.  Doing well.     Right knee OCD lesion with pain and mechanical symptoms    Plan: I had a long discussion with the patient at this time I reiterated to him that he has a standing offer to proceed with osteochondral allograft transplantation to his left lateral femoral condyle at any time he desires.  He is currently not symptomatic so I do not think we  have to do this.    In regards to his right knee we are going to get an MRI of his right knee we will then plan to have a virtual or phone visit with him to discuss the results he lives very far distance away it is my guess that he will be a candidate for arthroscopic loose body removal as we performed the left side.    We did discuss in short the possibility of osteochondral allograft transplantation at the same time however I think we will need further clarity from the imaging the patient needs to completely stop smoking prior to this.    Again, thank you for allowing me to participate in the care of your patient.        Sincerely,        Hoang Castro MD

## 2020-08-11 ENCOUNTER — HOSPITAL ENCOUNTER (OUTPATIENT)
Dept: MRI IMAGING | Facility: HOSPITAL | Age: 25
Discharge: HOME | End: 2020-08-11
Attending: ORTHOPAEDIC SURGERY | Admitting: ORTHOPAEDIC SURGERY
Payer: COMMERCIAL

## 2020-08-11 DIAGNOSIS — M24.9 KNEE JOINT HYPERMOBILITY: ICD-10-CM

## 2020-08-11 PROCEDURE — 73721 MRI JNT OF LWR EXTRE W/O DYE: CPT | Mod: TC,RT

## 2020-08-17 ENCOUNTER — TELEPHONE (OUTPATIENT)
Dept: ORTHOPEDICS | Facility: CLINIC | Age: 25
End: 2020-08-17

## 2020-08-17 ENCOUNTER — VIRTUAL VISIT (OUTPATIENT)
Dept: ORTHOPEDICS | Facility: CLINIC | Age: 25
End: 2020-08-17
Payer: COMMERCIAL

## 2020-08-17 DIAGNOSIS — M23.41 LOOSE BODY OF RIGHT KNEE: Primary | ICD-10-CM

## 2020-08-17 NOTE — PROGRESS NOTES
"Kulwant Santillan is a 24 year old male who is being evaluated via a billable telephone visit.      The patient has been notified of following:     \"This telephone visit will be conducted via a call between you and your physician/provider. We have found that certain health care needs can be provided without the need for a physical exam.  This service lets us provide the care you need with a short phone conversation.  If a prescription is necessary we can send it directly to your pharmacy.  If lab work is needed we can place an order for that and you can then stop by our lab to have the test done at a later time.    Telephone visits are billed at different rates depending on your insurance coverage. During this emergency period, for some insurers they may be billed the same as an in-person visit.  Please reach out to your insurance provider with any questions.    If during the course of the call the physician/provider feels a telephone visit is not appropriate, you will not be charged for this service.\"    Patient has given verbal consent for Telephone visit?  Yes    What phone number would you like to be contacted at? [phone]    How would you like to obtain your AVS? Ana    I had a chance to complete a telephone visit with the patient today for follow-up after his knee MRI.  To review we previously diagnosed him with bilateral osteochondritis dissecans lesionThese are on his bilateral lateral femoral condyles.    Approximately 2 years ago I performed an arthroscopy and loose body removal from his left knee and is very happy with this.  He saw me last week where we ordered an MRI of his right knee and he follows up over the phone today.    No examination was completed today as this was a telephone visit    MRI of his right knee does confirm an unstable OCD lesion with progressive cystic change on the lateral femoral condyle of his right knee    Clinical assessment: Left knee OCD lesion, unstable 2-year status " post fragment excision, doing well.       Right knee OCD lesion with unstable osteochondral fragment    Plan: Long discussion with the patient reviewed with him his diagnosis potential treatment options I offered him right knee arthroscopy, chondroplasty, loose body removal.  We discussed the risk benefits complication techniques and alternatives to surgery.  We reviewed the expected course of recovery alternative treatment options.  I specifically discussed with him that if we take this fragment out he may need cartilage reconstruction surgery in the future such as osteochondral allograft transplantation.  He understands this as we discussed this on his left side as well.  We will look for time to schedule this can be complete.    Phone call duration: 10 minutes    Hoang Castro MD

## 2020-08-17 NOTE — TELEPHONE ENCOUNTER
Phoned patient to schedule surgery with Dr Castro. I left him my direct number to call when he is ready. [phone].

## 2020-08-17 NOTE — LETTER
8/17/2020         RE: Kulwant Santillan  [address] [place] [id number]        Dear Colleague,    Thank you for referring your patient, Kulwant Santillan, to the [facility]. Please see a copy of my visit note below.    Kulwant Santillan is a 24 year old male who is being evaluated via a billable telephone visit.      I had a chance to complete a telephone visit with the patient today for follow-up after his knee MRI.  To review we previously diagnosed him with bilateral osteochondritis dissecans lesionThese are on his bilateral lateral femoral condyles.    Approximately 2 years ago I performed an arthroscopy and loose body removal from his left knee and is very happy with this.  He saw me last week where we ordered an MRI of his right knee and he follows up over the phone today.    No examination was completed today as this was a telephone visit    MRI of his right knee does confirm an unstable OCD lesion with progressive cystic change on the lateral femoral condyle of his right knee    Clinical assessment: Left knee OCD lesion, unstable 2-year status post fragment excision, doing well.       Right knee OCD lesion with unstable osteochondral fragment    Plan: Long discussion with the patient reviewed with him his diagnosis potential treatment options I offered him right knee arthroscopy, chondroplasty, loose body removal.  We discussed the risk benefits complication techniques and alternatives to surgery.  We reviewed the expected course of recovery alternative treatment options.  I specifically discussed with him that if we take this fragment out he may need cartilage reconstruction surgery in the future such as osteochondral allograft transplantation.  He understands this as we discussed this on his left side as well.  We will look for time to schedule this can be complete.    Phone call duration: 10 minutes    Hoang Castro MD

## 2020-08-18 PROBLEM — M23.41 LOOSE BODY OF RIGHT KNEE: Status: ACTIVE | Noted: 2020-08-18

## 2020-08-18 NOTE — TELEPHONE ENCOUNTER
Patient is scheduled for surgery with Dr. Castro    Spoke or left message with: Patient's mother    Date of Surgery: 9/29/20    Location: [facility]    Post op: 1 & 6 weeks    Pre-op with surgeon (if applicable): Complete    H&P: Patient will schedule with local PCP    Additional imaging/appointments: N/A    Surgery packet: Mailed to patient's home today     Additional comments: Patient's mother aware he will need COVID test to be completed within 4 days of surgery

## 2020-08-19 DIAGNOSIS — Z11.59 ENCOUNTER FOR SCREENING FOR OTHER VIRAL DISEASES: Primary | ICD-10-CM

## 2020-08-20 DIAGNOSIS — M23.41 LOOSE BODY OF RIGHT KNEE: Primary | ICD-10-CM

## 2020-09-04 NOTE — PATIENT INSTRUCTIONS

## 2020-09-04 NOTE — PROGRESS NOTES
[facility]  [address]  [address]  [place] [address]  [phone]  Dept: [phone]    PRE-OP EVALUATION:  Today's date: 9/10/2020    Kulwant Santillan (: 1995) presents for pre-operative evaluation assessment as requested by Dr. Castro.  He requires evaluation and anesthesia risk assessment prior to undergoing surgery/procedure for treatment of Right Knee pain .    Proposed Surgery/ Procedure: Examination under anesthesia, knee arthroscopy, chondroplasty, loosy body removal  Date of Surgery/ Procedure: 20  Time of Surgery/ Procedure: [facility]  Hospital/Surgical Facility: [facility],   Surgery Fax Number:   Primary Physician: Marina Stuart  Type of Anesthesia Anticipated: to be determined    Preoperative Questionnaire:   No - Have you ever had a heart attack or stroke?  No - Have you ever had surgery on your heart or blood vessels, such as a stent, coronary (heart) bypass, or surgery on an artery in the head, neck, heart, or legs?  No - Do you have chest pain when you are physically active?  No - Do you have a history of heart failure?  No - Do you currently have a cold, bronchitis, or symptoms of other respiratory (head and chest) infections?  No - Do you have a cough, shortness of breath, or wheezing?  No - Do you or anyone in your family have a history of blood clots?  No - Do you or anyone in your family have a serious bleeding problem, such as long-lasting bleeding after surgeries or cuts?  No - Have you ever had anemia or been told to take iron pills?  No - Have you had any abnormal blood loss such as black, tarry or bloody stools, or abnormal vaginal bleeding?  No - Have you ever had a blood transfusion?  Yes - Are you willing to have a blood transfusion if it is medically needed before, during, or after your surgery?  No - Have you or anyone in your family ever had problems with anesthesia (sedation for surgery)?  No - Do you have sleep apnea, excessive  snoring, or daytime drowsiness?   No - Do you have any artifical heart valves or other implanted medical devices, such as a pacemaker, defibrillator, or continuous glucose monitor?  No - Do you have any artifical joints?  No - Are you allergic to latex?  No - Is there any chance that you may be pregnant?    Patient has a Health Care Directive or Living Will:  NO    HPI:     HPI related to upcoming procedure: Longstanding right knee pain that has not been controlled with conservative       He is feeling well and does not have new concerns.      Covid test is scheduled for September 25, 2020.      MEDICAL HISTORY:     Patient Active Problem List    Diagnosis Date Noted     Loose body of right knee 08/18/2020     Priority: Medium     Added automatically from request for surgery [id number]       Knee joint hypermobility 10/04/2017     Priority: Medium     ACP (advance care planning) 07/07/2016     Priority: Medium     Advance Care Planning 7/7/2016: ACP Review of Chart / Resources Provided:  Reviewed chart for advance care plan.  Kulwant BALJINDER Santillan has no plan or code status on file. Discussed available resources and declined information. Confirmed code status reflects current choices pending further ACP discussions.  Confirmed/documented legally designated decision makers.  Added by Priti Pearson 06/19/2014     Priority: Medium      History reviewed. No pertinent past medical history.  Past Surgical History:   Procedure Laterality Date     ARTHROSCOPY KNEE WITH DEBRIDEMENT JOINT, COMBINED Left 6/22/2018    Procedure: ARTHROSCOPY KNEE WITH DEBRIDEMENT JOINT;  Examination Under Anesthesia Left Knee, Left Knee Arthroscopy, Chondroplasty, Loose Body Removal  ;  Surgeon: Hoang Castro MD;  Location:  OR     CIRCUMCISION       ORTHOPEDIC SURGERY  9-2013    rt knee, open, tendon repair     No current outpatient medications on file.     OTC products: None, except as noted above, no recent use of OTC  "ASA, NSAIDS or Steroids and no use of herbal medications or other supplements    Allergies   Allergen Reactions     Cefprozil      Penicillins      Sulfa Drugs      Sulfonamide antibiotics     Sulfamethoxazole      Trimethoprim      Amoxicillin Trihydrate Rash     Clavulanic Acid Potassium Rash      Latex Allergy: NO    Social History     Tobacco Use     Smoking status: Former Smoker     Smokeless tobacco: Never Used     Tobacco comment: no passive exposure   Substance Use Topics     Alcohol use: No     History   Drug Use No       REVIEW OF SYSTEMS:   CONSTITUTIONAL: NEGATIVE for fever, chills, change in weight  INTEGUMENTARY/SKIN: NEGATIVE for worrisome rashes, moles or lesions  EYES: NEGATIVE for vision changes or irritation  ENT/MOUTH: NEGATIVE for ear, mouth and throat problems  RESP: NEGATIVE for significant cough or SOB  BREAST: NEGATIVE for masses, tenderness or discharge  CV: NEGATIVE for chest pain, palpitations or peripheral edema  GI: NEGATIVE for nausea, abdominal pain, heartburn, or change in bowel habits  : NEGATIVE for frequency, dysuria, or hematuria  MUSCULOSKELETAL:right knee pain  NEURO: NEGATIVE for weakness, dizziness or paresthesias  ENDOCRINE: NEGATIVE for temperature intolerance, skin/hair changes  HEME: NEGATIVE for bleeding problems  PSYCHIATRIC: NEGATIVE for changes in mood or affect    EXAM:   /58 (BP Location: Left arm, Patient Position: Chair, Cuff Size: Adult Regular)   Pulse 78   Temp 97.2  F (36.2  C) (Tympanic)   Resp 16   Ht 1.91 m (6' 3.2\")   Wt 67.3 kg (148 lb 4.8 oz)   SpO2 100%   BMI 18.44 kg/m      GENERAL APPEARANCE: healthy, alert and no distress, tall thin     EYES: EOMI,  PERRL     HENT: ear canals and TM's normal and nose and mouth without ulcers or lesions     NECK: no adenopathy, no asymmetry, masses, or scars and thyroid normal to palpation     RESP: lungs clear to auscultation - no rales, rhonchi or wheezes     CV: regular rates and rhythm, normal S1 " S2, no S3 or S4 and no murmur, click or rub     ABDOMEN:  soft, nontender, no HSM or masses and bowel sounds normal     MS: extremities normal- no gross deformities noted, no evidence of inflammation in joints, FROM in all extremities.     SKIN: no suspicious lesions or rashes     NEURO: Normal strength and tone, sensory exam grossly normal, mentation intact and speech normal     PSYCH: mentation appears normal. and affect normal/bright     LYMPHATICS: No cervical adenopathy    DIAGNOSTICS:     Results for orders placed or performed in visit on 09/10/20   Basic metabolic panel     Status: Abnormal   Result Value Ref Range    Sodium 140 133 - 144 mmol/L    Potassium 3.8 3.4 - 5.3 mmol/L    Chloride 106 94 - 109 mmol/L    Carbon Dioxide 29 20 - 32 mmol/L    Anion Gap 5 3 - 14 mmol/L    Glucose 68 (L) 70 - 99 mg/dL    Urea Nitrogen 14 7 - 30 mg/dL    Creatinine 0.79 0.66 - 1.25 mg/dL    GFR Estimate >90 >60 mL/min/[1.73_m2]    GFR Estimate If Black >90 >60 mL/min/[1.73_m2]    Calcium 9.3 8.5 - 10.1 mg/dL   CBC with platelets differential     Status: None   Result Value Ref Range    WBC 5.1 4.0 - 11.0 10e9/L    RBC Count 5.22 4.4 - 5.9 10e12/L    Hemoglobin 15.8 13.3 - 17.7 g/dL    Hematocrit 46.4 40.0 - 53.0 %    MCV 89 78 - 100 fl    MCH 30.3 26.5 - 33.0 pg    MCHC 34.1 31.5 - 36.5 g/dL    RDW 12.2 10.0 - 15.0 %    Platelet Count 270 150 - 450 10e9/L    % Neutrophils 42.5 %    % Lymphocytes 38.6 %    % Monocytes 13.0 %    % Eosinophils 4.9 %    % Basophils 1.0 %    Absolute Neutrophil 2.2 1.6 - 8.3 10e9/L    Absolute Lymphocytes 2.0 0.8 - 5.3 10e9/L    Absolute Monocytes 0.7 0.0 - 1.3 10e9/L    Absolute Eosinophils 0.3 0.0 - 0.7 10e9/L    Absolute Basophils 0.1 0.0 - 0.2 10e9/L    Diff Method Automated Method          Recent Labs   Lab Test 06/08/18  1407 07/07/16  1136   HGB 15.8 16.8    201    141   POTASSIUM 4.4 3.4   CR 0.82 0.79        IMPRESSION:   Reason for surgery/procedure: right knee loose  bodies causing pain   Diagnosis/reason for consult: anesthesia risk assessment     The proposed surgical procedure is considered INTERMEDIATE risk.    REVISED CARDIAC RISK INDEX  The patient has the following serious cardiovascular risks for perioperative complications such as (MI, PE, VFib and 3  AV Block):  No serious cardiac risks  INTERPRETATION: 0 risks: Class I (very low risk - 0.4% complication rate)    The patient has the following additional risks for perioperative complications:  No identified additional risks      ICD-10-CM    1. Preop general physical exam  Z01.818 Basic metabolic panel     CBC with platelets differential     CANCELED: XR CHEST 2 VW (Clinic Performed)   2. Loose body of right knee  M23.41    3. Knee joint hypermobility  M24.9    4. Tobacco abuse counseling  Z71.6        RECOMMENDATIONS:       Cardiovascular Risk  Performs 4 METs exercise without symptoms (Walk on level ground at 15 minutes per mile (4 miles/hour)) .       Pulmonary Risk  Advised smoking cessation.       --Patient is to HOLD all scheduled medications on the day of surgery EXCEPT for modifications listed below.    APPROVAL GIVEN to proceed with proposed procedure, without further diagnostic evaluation       Signed Electronically by: Marina Stuart NP    Copy of this evaluation report is provided to requesting physician.    [facility] Preop Guidelines    Revised Cardiac Risk Index

## 2020-09-10 ENCOUNTER — OFFICE VISIT (OUTPATIENT)
Dept: FAMILY MEDICINE | Facility: OTHER | Age: 25
End: 2020-09-10
Attending: NURSE PRACTITIONER
Payer: COMMERCIAL

## 2020-09-10 VITALS
BODY MASS INDEX: 18.44 KG/M2 | HEART RATE: 78 BPM | RESPIRATION RATE: 16 BRPM | DIASTOLIC BLOOD PRESSURE: 58 MMHG | OXYGEN SATURATION: 100 % | SYSTOLIC BLOOD PRESSURE: 104 MMHG | TEMPERATURE: 97.2 F | WEIGHT: 148.3 LBS | HEIGHT: 75 IN

## 2020-09-10 DIAGNOSIS — M23.41 LOOSE BODY OF RIGHT KNEE: ICD-10-CM

## 2020-09-10 DIAGNOSIS — Z01.818 PREOP GENERAL PHYSICAL EXAM: Primary | ICD-10-CM

## 2020-09-10 DIAGNOSIS — Z71.6 TOBACCO ABUSE COUNSELING: ICD-10-CM

## 2020-09-10 DIAGNOSIS — M24.9 KNEE JOINT HYPERMOBILITY: ICD-10-CM

## 2020-09-10 LAB
ANION GAP SERPL CALCULATED.3IONS-SCNC: 5 MMOL/L (ref 3–14)
BASOPHILS # BLD AUTO: 0.1 10E9/L (ref 0–0.2)
BASOPHILS NFR BLD AUTO: 1 %
BUN SERPL-MCNC: 14 MG/DL (ref 7–30)
CALCIUM SERPL-MCNC: 9.3 MG/DL (ref 8.5–10.1)
CHLORIDE SERPL-SCNC: 106 MMOL/L (ref 94–109)
CO2 SERPL-SCNC: 29 MMOL/L (ref 20–32)
CREAT SERPL-MCNC: 0.79 MG/DL (ref 0.66–1.25)
DIFFERENTIAL METHOD BLD: NORMAL
EOSINOPHIL # BLD AUTO: 0.3 10E9/L (ref 0–0.7)
EOSINOPHIL NFR BLD AUTO: 4.9 %
ERYTHROCYTE [DISTWIDTH] IN BLOOD BY AUTOMATED COUNT: 12.2 % (ref 10–15)
GFR SERPL CREATININE-BSD FRML MDRD: >90 ML/MIN/{1.73_M2}
GLUCOSE SERPL-MCNC: 68 MG/DL (ref 70–99)
HCT VFR BLD AUTO: 46.4 % (ref 40–53)
HGB BLD-MCNC: 15.8 G/DL (ref 13.3–17.7)
LYMPHOCYTES # BLD AUTO: 2 10E9/L (ref 0.8–5.3)
LYMPHOCYTES NFR BLD AUTO: 38.6 %
MCH RBC QN AUTO: 30.3 PG (ref 26.5–33)
MCHC RBC AUTO-ENTMCNC: 34.1 G/DL (ref 31.5–36.5)
MCV RBC AUTO: 89 FL (ref 78–100)
MONOCYTES # BLD AUTO: 0.7 10E9/L (ref 0–1.3)
MONOCYTES NFR BLD AUTO: 13 %
NEUTROPHILS # BLD AUTO: 2.2 10E9/L (ref 1.6–8.3)
NEUTROPHILS NFR BLD AUTO: 42.5 %
PLATELET # BLD AUTO: 270 10E9/L (ref 150–450)
POTASSIUM SERPL-SCNC: 3.8 MMOL/L (ref 3.4–5.3)
RBC # BLD AUTO: 5.22 10E12/L (ref 4.4–5.9)
SODIUM SERPL-SCNC: 140 MMOL/L (ref 133–144)
WBC # BLD AUTO: 5.1 10E9/L (ref 4–11)

## 2020-09-10 PROCEDURE — 80048 BASIC METABOLIC PNL TOTAL CA: CPT | Performed by: NURSE PRACTITIONER

## 2020-09-10 PROCEDURE — 99214 OFFICE O/P EST MOD 30 MIN: CPT | Performed by: NURSE PRACTITIONER

## 2020-09-10 PROCEDURE — 36415 COLL VENOUS BLD VENIPUNCTURE: CPT | Performed by: NURSE PRACTITIONER

## 2020-09-10 PROCEDURE — 85025 COMPLETE CBC W/AUTO DIFF WBC: CPT | Performed by: NURSE PRACTITIONER

## 2020-09-10 ASSESSMENT — PAIN SCALES - GENERAL: PAINLEVEL: MODERATE PAIN (5)

## 2020-09-10 ASSESSMENT — MIFFLIN-ST. JEOR: SCORE: 1756.48

## 2020-09-10 NOTE — NURSING NOTE
"Chief Complaint   Patient presents with     Pre-Op Exam       Initial /58 (BP Location: Left arm, Patient Position: Chair, Cuff Size: Adult Regular)   Pulse 78   Temp 97.2  F (36.2  C) (Tympanic)   Resp 16   Ht 1.91 m (6' 3.2\")   Wt 67.3 kg (148 lb 4.8 oz)   SpO2 100%   BMI 18.44 kg/m   Estimated body mass index is 18.44 kg/m  as calculated from the following:    Height as of this encounter: 1.91 m (6' 3.2\").    Weight as of this encounter: 67.3 kg (148 lb 4.8 oz).  Medication Reconciliation: complete  Pamela M. Lechevalier, LPN    "

## 2020-09-25 ENCOUNTER — OFFICE VISIT (OUTPATIENT)
Dept: FAMILY MEDICINE | Facility: OTHER | Age: 25
End: 2020-09-25
Attending: FAMILY MEDICINE
Payer: COMMERCIAL

## 2020-09-25 DIAGNOSIS — Z11.59 ENCOUNTER FOR SCREENING FOR OTHER VIRAL DISEASES: ICD-10-CM

## 2020-09-25 PROCEDURE — U0003 INFECTIOUS AGENT DETECTION BY NUCLEIC ACID (DNA OR RNA); SEVERE ACUTE RESPIRATORY SYNDROME CORONAVIRUS 2 (SARS-COV-2) (CORONAVIRUS DISEASE [COVID-19]), AMPLIFIED PROBE TECHNIQUE, MAKING USE OF HIGH THROUGHPUT TECHNOLOGIES AS DESCRIBED BY CMS-2020-01-R: HCPCS | Performed by: ORTHOPAEDIC SURGERY

## 2020-09-26 LAB
SARS-COV-2 RNA SPEC QL NAA+PROBE: NOT DETECTED
SPECIMEN SOURCE: NORMAL

## 2020-09-28 ENCOUNTER — ANESTHESIA EVENT (OUTPATIENT)
Dept: SURGERY | Facility: AMBULATORY SURGERY CENTER | Age: 25
End: 2020-09-28

## 2020-09-29 ENCOUNTER — HOSPITAL ENCOUNTER (OUTPATIENT)
Facility: AMBULATORY SURGERY CENTER | Age: 25
End: 2020-09-29
Attending: ORTHOPAEDIC SURGERY
Payer: COMMERCIAL

## 2020-09-29 ENCOUNTER — ANESTHESIA (OUTPATIENT)
Dept: SURGERY | Facility: AMBULATORY SURGERY CENTER | Age: 25
End: 2020-09-29

## 2020-09-29 VITALS
TEMPERATURE: 98 F | BODY MASS INDEX: 18.02 KG/M2 | RESPIRATION RATE: 12 BRPM | OXYGEN SATURATION: 98 % | HEART RATE: 74 BPM | SYSTOLIC BLOOD PRESSURE: 101 MMHG | DIASTOLIC BLOOD PRESSURE: 59 MMHG | HEIGHT: 76 IN | WEIGHT: 148 LBS

## 2020-09-29 DIAGNOSIS — M23.41 LOOSE BODY OF RIGHT KNEE: ICD-10-CM

## 2020-09-29 DIAGNOSIS — Z98.890 S/P ARTHROSCOPY OF RIGHT KNEE: Primary | ICD-10-CM

## 2020-09-29 RX ORDER — SODIUM CHLORIDE, SODIUM LACTATE, POTASSIUM CHLORIDE, CALCIUM CHLORIDE 600; 310; 30; 20 MG/100ML; MG/100ML; MG/100ML; MG/100ML
INJECTION, SOLUTION INTRAVENOUS CONTINUOUS
Status: DISCONTINUED | OUTPATIENT
Start: 2020-09-29 | End: 2020-09-29 | Stop reason: HOSPADM

## 2020-09-29 RX ORDER — ONDANSETRON 4 MG/1
4 TABLET, ORALLY DISINTEGRATING ORAL EVERY 30 MIN PRN
Status: DISCONTINUED | OUTPATIENT
Start: 2020-09-29 | End: 2020-09-30 | Stop reason: HOSPADM

## 2020-09-29 RX ORDER — NALOXONE HYDROCHLORIDE 0.4 MG/ML
.1-.4 INJECTION, SOLUTION INTRAMUSCULAR; INTRAVENOUS; SUBCUTANEOUS
Status: DISCONTINUED | OUTPATIENT
Start: 2020-09-29 | End: 2020-09-30 | Stop reason: HOSPADM

## 2020-09-29 RX ORDER — ONDANSETRON 2 MG/ML
4 INJECTION INTRAMUSCULAR; INTRAVENOUS EVERY 30 MIN PRN
Status: DISCONTINUED | OUTPATIENT
Start: 2020-09-29 | End: 2020-09-30 | Stop reason: HOSPADM

## 2020-09-29 RX ORDER — CLINDAMYCIN PHOSPHATE 900 MG/50ML
900 INJECTION, SOLUTION INTRAVENOUS
Status: COMPLETED | OUTPATIENT
Start: 2020-09-29 | End: 2020-09-29

## 2020-09-29 RX ORDER — MEPERIDINE HYDROCHLORIDE 25 MG/ML
12.5 INJECTION INTRAMUSCULAR; INTRAVENOUS; SUBCUTANEOUS
Status: DISCONTINUED | OUTPATIENT
Start: 2020-09-29 | End: 2020-09-30 | Stop reason: HOSPADM

## 2020-09-29 RX ORDER — LIDOCAINE 40 MG/G
CREAM TOPICAL
Status: DISCONTINUED | OUTPATIENT
Start: 2020-09-29 | End: 2020-09-29 | Stop reason: HOSPADM

## 2020-09-29 RX ORDER — BUPIVACAINE HYDROCHLORIDE AND EPINEPHRINE 2.5; 5 MG/ML; UG/ML
INJECTION, SOLUTION INFILTRATION; PERINEURAL PRN
Status: DISCONTINUED | OUTPATIENT
Start: 2020-09-29 | End: 2020-09-29 | Stop reason: HOSPADM

## 2020-09-29 RX ORDER — OXYCODONE HYDROCHLORIDE 5 MG/1
5-10 TABLET ORAL EVERY 4 HOURS PRN
Qty: 15 TABLET | Refills: 0 | Status: SHIPPED | OUTPATIENT
Start: 2020-09-29 | End: 2020-10-08

## 2020-09-29 RX ORDER — LIDOCAINE HYDROCHLORIDE 20 MG/ML
INJECTION, SOLUTION INFILTRATION; PERINEURAL PRN
Status: DISCONTINUED | OUTPATIENT
Start: 2020-09-29 | End: 2020-09-29

## 2020-09-29 RX ORDER — PROPOFOL 10 MG/ML
INJECTION, EMULSION INTRAVENOUS PRN
Status: DISCONTINUED | OUTPATIENT
Start: 2020-09-29 | End: 2020-09-29

## 2020-09-29 RX ORDER — SODIUM CHLORIDE, SODIUM LACTATE, POTASSIUM CHLORIDE, CALCIUM CHLORIDE 600; 310; 30; 20 MG/100ML; MG/100ML; MG/100ML; MG/100ML
INJECTION, SOLUTION INTRAVENOUS CONTINUOUS
Status: DISCONTINUED | OUTPATIENT
Start: 2020-09-29 | End: 2020-09-30 | Stop reason: HOSPADM

## 2020-09-29 RX ORDER — ACETAMINOPHEN 325 MG/1
650 TABLET ORAL EVERY 4 HOURS
Qty: 80 TABLET | Refills: 0 | Status: SHIPPED | OUTPATIENT
Start: 2020-09-29 | End: 2020-11-12 | Stop reason: ALTCHOICE

## 2020-09-29 RX ORDER — DEXAMETHASONE SODIUM PHOSPHATE 4 MG/ML
INJECTION, SOLUTION INTRA-ARTICULAR; INTRALESIONAL; INTRAMUSCULAR; INTRAVENOUS; SOFT TISSUE PRN
Status: DISCONTINUED | OUTPATIENT
Start: 2020-09-29 | End: 2020-09-29

## 2020-09-29 RX ORDER — FENTANYL CITRATE 50 UG/ML
INJECTION, SOLUTION INTRAMUSCULAR; INTRAVENOUS PRN
Status: DISCONTINUED | OUTPATIENT
Start: 2020-09-29 | End: 2020-09-29

## 2020-09-29 RX ORDER — EPHEDRINE SULFATE 50 MG/ML
INJECTION, SOLUTION INTRAMUSCULAR; INTRAVENOUS; SUBCUTANEOUS PRN
Status: DISCONTINUED | OUTPATIENT
Start: 2020-09-29 | End: 2020-09-29

## 2020-09-29 RX ORDER — KETOROLAC TROMETHAMINE 30 MG/ML
INJECTION, SOLUTION INTRAMUSCULAR; INTRAVENOUS PRN
Status: DISCONTINUED | OUTPATIENT
Start: 2020-09-29 | End: 2020-09-29

## 2020-09-29 RX ORDER — ONDANSETRON 4 MG/1
4-8 TABLET, ORALLY DISINTEGRATING ORAL EVERY 8 HOURS PRN
Qty: 4 TABLET | Refills: 0 | Status: SHIPPED | OUTPATIENT
Start: 2020-09-29 | End: 2020-11-12

## 2020-09-29 RX ORDER — KETOROLAC TROMETHAMINE 30 MG/ML
30 INJECTION, SOLUTION INTRAMUSCULAR; INTRAVENOUS EVERY 6 HOURS PRN
Status: DISCONTINUED | OUTPATIENT
Start: 2020-09-29 | End: 2020-09-30 | Stop reason: HOSPADM

## 2020-09-29 RX ORDER — FENTANYL CITRATE 50 UG/ML
25-50 INJECTION, SOLUTION INTRAMUSCULAR; INTRAVENOUS
Status: DISCONTINUED | OUTPATIENT
Start: 2020-09-29 | End: 2020-09-29 | Stop reason: HOSPADM

## 2020-09-29 RX ORDER — IBUPROFEN 200 MG
600 TABLET ORAL
Status: DISCONTINUED | OUTPATIENT
Start: 2020-09-29 | End: 2020-09-30 | Stop reason: HOSPADM

## 2020-09-29 RX ORDER — ONDANSETRON 4 MG/1
4 TABLET, ORALLY DISINTEGRATING ORAL
Status: DISCONTINUED | OUTPATIENT
Start: 2020-09-29 | End: 2020-09-30 | Stop reason: HOSPADM

## 2020-09-29 RX ORDER — OXYCODONE HYDROCHLORIDE 5 MG/1
5 TABLET ORAL
Status: DISCONTINUED | OUTPATIENT
Start: 2020-09-29 | End: 2020-09-30 | Stop reason: HOSPADM

## 2020-09-29 RX ORDER — PROPOFOL 10 MG/ML
INJECTION, EMULSION INTRAVENOUS CONTINUOUS PRN
Status: DISCONTINUED | OUTPATIENT
Start: 2020-09-29 | End: 2020-09-29

## 2020-09-29 RX ORDER — GLYCOPYRROLATE 0.2 MG/ML
INJECTION, SOLUTION INTRAMUSCULAR; INTRAVENOUS PRN
Status: DISCONTINUED | OUTPATIENT
Start: 2020-09-29 | End: 2020-09-29

## 2020-09-29 RX ORDER — GABAPENTIN 300 MG/1
300 CAPSULE ORAL ONCE
Status: COMPLETED | OUTPATIENT
Start: 2020-09-29 | End: 2020-09-29

## 2020-09-29 RX ORDER — CLINDAMYCIN PHOSPHATE 900 MG/50ML
900 INJECTION, SOLUTION INTRAVENOUS SEE ADMIN INSTRUCTIONS
Status: DISCONTINUED | OUTPATIENT
Start: 2020-09-29 | End: 2020-09-29 | Stop reason: HOSPADM

## 2020-09-29 RX ORDER — ACETAMINOPHEN 325 MG/1
975 TABLET ORAL ONCE
Status: COMPLETED | OUTPATIENT
Start: 2020-09-29 | End: 2020-09-29

## 2020-09-29 RX ORDER — ONDANSETRON 2 MG/ML
INJECTION INTRAMUSCULAR; INTRAVENOUS PRN
Status: DISCONTINUED | OUTPATIENT
Start: 2020-09-29 | End: 2020-09-29

## 2020-09-29 RX ORDER — OXYCODONE HYDROCHLORIDE 5 MG/1
5 TABLET ORAL EVERY 4 HOURS PRN
Status: DISCONTINUED | OUTPATIENT
Start: 2020-09-29 | End: 2020-09-30 | Stop reason: HOSPADM

## 2020-09-29 RX ORDER — AMOXICILLIN 250 MG
1-2 CAPSULE ORAL 2 TIMES DAILY
Qty: 12 TABLET | Refills: 0 | Status: SHIPPED | OUTPATIENT
Start: 2020-09-29 | End: 2020-11-12

## 2020-09-29 RX ORDER — HYDROMORPHONE HYDROCHLORIDE 1 MG/ML
.3-.5 INJECTION, SOLUTION INTRAMUSCULAR; INTRAVENOUS; SUBCUTANEOUS EVERY 10 MIN PRN
Status: DISCONTINUED | OUTPATIENT
Start: 2020-09-29 | End: 2020-09-30 | Stop reason: HOSPADM

## 2020-09-29 RX ADMIN — PROPOFOL 200 MCG/KG/MIN: 10 INJECTION, EMULSION INTRAVENOUS at 08:15

## 2020-09-29 RX ADMIN — EPHEDRINE SULFATE 5 MG: 50 INJECTION, SOLUTION INTRAMUSCULAR; INTRAVENOUS; SUBCUTANEOUS at 08:35

## 2020-09-29 RX ADMIN — ONDANSETRON 4 MG: 2 INJECTION INTRAMUSCULAR; INTRAVENOUS at 08:11

## 2020-09-29 RX ADMIN — EPHEDRINE SULFATE 5 MG: 50 INJECTION, SOLUTION INTRAMUSCULAR; INTRAVENOUS; SUBCUTANEOUS at 08:37

## 2020-09-29 RX ADMIN — ACETAMINOPHEN 975 MG: 325 TABLET ORAL at 07:02

## 2020-09-29 RX ADMIN — KETOROLAC TROMETHAMINE 30 MG: 30 INJECTION, SOLUTION INTRAMUSCULAR; INTRAVENOUS at 08:57

## 2020-09-29 RX ADMIN — PROPOFOL 200 MG: 10 INJECTION, EMULSION INTRAVENOUS at 08:15

## 2020-09-29 RX ADMIN — Medication 100 MCG: at 08:37

## 2020-09-29 RX ADMIN — FENTANYL CITRATE 50 MCG: 50 INJECTION, SOLUTION INTRAMUSCULAR; INTRAVENOUS at 08:29

## 2020-09-29 RX ADMIN — LIDOCAINE HYDROCHLORIDE 80 MG: 20 INJECTION, SOLUTION INFILTRATION; PERINEURAL at 08:14

## 2020-09-29 RX ADMIN — DEXAMETHASONE SODIUM PHOSPHATE 4 MG: 4 INJECTION, SOLUTION INTRA-ARTICULAR; INTRALESIONAL; INTRAMUSCULAR; INTRAVENOUS; SOFT TISSUE at 08:11

## 2020-09-29 RX ADMIN — GABAPENTIN 300 MG: 300 CAPSULE ORAL at 07:02

## 2020-09-29 RX ADMIN — GLYCOPYRROLATE 0.2 MG: 0.2 INJECTION, SOLUTION INTRAMUSCULAR; INTRAVENOUS at 08:11

## 2020-09-29 RX ADMIN — FENTANYL CITRATE 50 MCG: 50 INJECTION, SOLUTION INTRAMUSCULAR; INTRAVENOUS at 08:11

## 2020-09-29 RX ADMIN — CLINDAMYCIN PHOSPHATE 900 MG: 900 INJECTION, SOLUTION INTRAVENOUS at 08:05

## 2020-09-29 RX ADMIN — SODIUM CHLORIDE, SODIUM LACTATE, POTASSIUM CHLORIDE, CALCIUM CHLORIDE: 600; 310; 30; 20 INJECTION, SOLUTION INTRAVENOUS at 07:04

## 2020-09-29 ASSESSMENT — MIFFLIN-ST. JEOR: SCORE: 1754.88

## 2020-09-29 ASSESSMENT — LIFESTYLE VARIABLES: TOBACCO_USE: 1

## 2020-09-29 NOTE — ANESTHESIA CARE TRANSFER NOTE
Patient: Kulwant Santillan    Procedure(s):  Right knee Examination under anesthesia, knee arthroscopy, chondroplasty, loosy body removal    Diagnosis: Loose body of right knee [M23.41]  Diagnosis Additional Information: No value filed.    Anesthesia Type:   General     Note:  Airway :Room Air  Patient transferred to:PACU  Comments: Uneventful transport to PACU; VSS; Report given to RN; Pt comfortable; IV patent: pt exchanging wellHandoff Report: Identifed the Patient, Identified the Reponsible Provider, Reviewed the pertinent medical history, Discussed the surgical course, Reviewed Intra-OP anesthesia mangement and issues during anesthesia, Set expectations for post-procedure period and Allowed opportunity for questions and acknowledgement of understanding      Vitals: (Last set prior to Anesthesia Care Transfer)    CRNA VITALS  9/29/2020 0837 - 9/29/2020 0911      9/29/2020             Pulse:  55    SpO2:  98 %    Resp Rate (observed):  (!) 4                Electronically Signed By: BONY Gardner CRNA  September 29, 2020  9:11 AM

## 2020-09-29 NOTE — DISCHARGE INSTRUCTIONS
[facility]  Home Care Following Anesthesia  For 24 hours after surgery:  1. Get plenty of rest.  A responsible adult must stay with you for at least 24 hours after you leave the surgery center.  2. Do not drive or use heavy equipment.  If you have weakness or tingling, don't drive or use heavy equipment until this feeling goes away.   3. Do not drink alcohol.   4. Avoid strenuous or risky activities.  Ask for help when climbing stairs.  5. You may feel lightheaded.  IF so, sit for a few minutes before standing.  Have someone help you get up.   6. If you have nausea (feel sick to your stomach): Drink only clear liquids such as apple juice, ginger ale, broth or 7-Up.  Rest may also help.  Be sure to drink enough fluids.  Move to a regular diet as you feel able.   7. You may have a slight fever.  Call the doctor if your fever is over 100 F (37.7 C) (taken under the tongue) or lasts longer than 24 hours.  8. You may have a dry mouth, a sore throat, muscle aches or trouble sleeping. These should go away after 24 hours.  9. Do not make important or legal decisions.               Tips for taking pain medications  To get the best pain relief possible, remember these points:    Take pain medications as directed, before pain becomes severe.    Pain medication can upset your stomach: taking it with food may help.    Constipation is a common side effect of pain medication. Drink plenty of  fluids.    Eat foods high in fiber. Take a stool softener if recommended by your doctor or pharmacist.    Do not drink alcohol, drive or operate machinery while taking pain medications.    Ask about other ways to control pain, such as with heat, ice or relaxation.    Tylenol/Acetaminophen Consumption  To help encourage the safe use of acetaminophen, the makers of TYLENOL  have lowered the maximum daily dose for single-ingredient Extra Strength TYLENOL  (acetaminophen) products sold in the [place] from 8  pills per day (4,000 mg) to 6 pills per day (3,000 mg). The dosing interval has also changed from 2 pills every 4-6 hours to 2 pills every 6 hours.    If you feel your pain relief is insufficient, you may take Tylenol/Acetaminophen in addition to your narcotic pain medication.     Be careful not to exceed 3,000 mg of Tylenol/Acetaminophen in a 24 hour period from all sources.    If you are taking extra strength Tylenol/acetaminophen (500 mg), the maximum dose is 6 tablets in 24 hours.    If you are taking regular strength acetaminophen (325 mg), the maximum dose is 9 tablets in 24 hours.    Call a doctor for any of the followin. Signs of infection (fever, growing tenderness at the surgery site, a large amount of drainage or bleeding, severe pain, foul-smelling drainage, redness, swelling).  2. It has been over 8 to 10 hours since surgery and you are still not able to urinate (pass water).  3. Headache for over 24 hours.  4. Numbness, tingling or weakness the day after surgery (if you had spinal anesthesia).  5. Signs of Covid-19 infection (temperature over 100 degrees, shortness of breath, cough, loss of taste/smell, generalized body aches, persistent headache, chills, sore throat, nausea/vomiting/diarrhea)  Your doctor is:       Dr. Hoang Castro, Orthopaedics: [phone]               Or dial [phone] and ask for the resident on call for:  Orthopaedics  For emergency care, call the:  [facility] Emergency Department: [phone] (TTY for hearing impaired: [phone])

## 2020-09-29 NOTE — OP NOTE
"PREOPERATIVE DIAGNOSIS:   1. 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 Status post fragment excision of unrepairable OCD lesion lateral femoral condyle left knee    POSTOPERATIVE DIAGNOSIS:  1. 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 Status post fragment excision of unrepairable OCD lesion lateral femoral condyle left knee    PROCEDURE:  1. Examination under anesthesia right knee  2. Right knee arthroscopy  3. Chondroplasty lateral femoral condyle  4. Loose body removal of unstable OCD lesion requiring enlargement of the medial portal    DATE OF SURGERY: 9/29/2020    SURGEON: Hoang Castro MD    ASSISTANT: None.     RESIDENT OR FELLOW: Kulwant Medina MD    OPERATIVE INDICATIONS: Kulwant Santillan is a pleasant 25 year old male who I saw through my orthopedic clinic with a history, physical, imaging consistent with right knee pain as well as locking and catching.  This seems to stem from his unstable and unrepairable OCD lesion to the lateral femoral condyle of his right knee.  I previously performed a loose body removal for an unstable and unrepairable OCD lesion of his left knee.  He is tolerated this well.  And despite having a large \"hole\" in his lateral femoral condyle he reports no symptoms.  He would like a similar arthroscopic procedure on the right.  He understands that there is a risk of continued pain going forward..  I reviewed with the patient the risks, benefits, complications, techniques and alternatives to surgery.  We reviewed the expected course of recovery and the potential expected outcomes.  The patient understood both the risks and benefits and desired to proceed despite the risks.    OPERATIVE DETAILS: In the preoperative area the patient's informed consent was reviewed and they desired to proceed.  The right leg was marked and the patient was in agreement.  The patient was taken to the operating room where a timeout was performed and all " parties were in agreement.  Preoperative antibiotics were given within 1 hour of the time of incision.  The patient was placed in the supine position and surrendered to LMA anesthesia.  No tourniquet was applied.  Egg crate was placed beneath the well leg and a side post was utilized.  The operative leg was prepped and draped in the usual sterile fashion.     Examination Under Anesthesia:    Range of motion was 0 to 135 degrees.  Stable to varus and valgus stress testing.  Stable anterior and posterior drawer testing.  No pivot shift.  2 quadrant lateral translation of the patella.  1 quadrant medial.  Tilt to neutral.  No pivot shift.    Anterior medial and anterior lateral arthroscopic portals were created and a diagnostic arthroscopy was performed the following findings: No loose bodies in the suprapatellar pouch, medial gutter, lateral gutter.  Medial femoral condyle, medial tibial plateau, medial meniscus was normal.  Medial patella facet central ridge lateral patella facet central trochlea normal.  Lateral tibial plateau normal.  Lateral meniscus normal.  ACL PCL normal.  Large unstable OCD lesion lateral femoral condyle measuring approximately 2 x 2 cm.  Clearly unstable.  Clearly unrepairable.    This time a grasper was introduced as well as a probe and this was fully mobilized.  It appeared to be dead in situ.  It was taken out after enlarging the medial portal.  Pictures were obtained.  A chondroplasty was then completed of the lateral femoral condyle until a smooth balanced rim of cartilage remained.    Copious irrigation was performed an a layered closure was initiated, sterile dressings were applied and the patient was transferred to the recovery room in stable condition with stable vital signs.    ESTIMATED BLOOD LOSS: 5 mL.    TOURNIQUET TIME: No tourniquet was placed.    COMPLICATIONS: None apparent.    DRAINS: None.    SPECIMENS: None.     POSTOPERATIVE PLAN:  1. Weightbearing as tolerated, range of  motion as tolerated  2. No running or jumping for 6 weeks  3. Follow-up locally or in my clinic at 1 week  4. The patient has a standing offer to proceed with cartilage reconstructive surgery for either knee this could be done at his convenience.  The surgical plan would be osteochondral allograft transplantation to the lateral femoral condyle of the affected knee.

## 2020-09-29 NOTE — ANESTHESIA PREPROCEDURE EVALUATION
"Anesthesia Pre-Procedure Evaluation    Patient: Kulwant Santillan   MRN:     [id number] Gender:   male   Age:    25 year old :      1995        Preoperative Diagnosis: Loose body of right knee [M23.41]   Procedure(s):  Examination under anesthesia, knee arthroscopy, chondroplasty, loosy body removal     LABS:  CBC:   Lab Results   Component Value Date    WBC 5.1 09/10/2020    WBC 7.2 2018    HGB 15.8 09/10/2020    HGB 15.8 2018    HCT 46.4 09/10/2020    HCT 46.1 2018     09/10/2020     2018     BMP:   Lab Results   Component Value Date     09/10/2020     2018    POTASSIUM 3.8 09/10/2020    POTASSIUM 4.4 2018    CHLORIDE 106 09/10/2020    CHLORIDE 108 2018    CO2 29 09/10/2020    CO2 31 2018    BUN 14 09/10/2020    BUN 14 2018    CR 0.79 09/10/2020    CR 0.82 2018    GLC 68 (L) 09/10/2020    GLC 87 2018     COAGS: No results found for: PTT, INR, FIBR  POC: No results found for: BGM, HCG, HCGS  OTHER:   Lab Results   Component Value Date    TRISTIN 9.3 09/10/2020    ALBUMIN 4.1 2016    PROTTOTAL 7.8 2016    ALT 21 2016    AST 16 2016    ALKPHOS 64 (L) 2016    BILITOTAL 1.1 2016    TSH 1.97 2016    CRP <2.9 2016    SED 1 2016        Preop Vitals    BP Readings from Last 3 Encounters:   20 119/74   09/10/20 104/58   18 100/60    Pulse Readings from Last 3 Encounters:   20 65   09/10/20 78   18 98      Resp Readings from Last 3 Encounters:   20 16   09/10/20 16   18 16    SpO2 Readings from Last 3 Encounters:   20 100%   09/10/20 100%   18 99%      Temp Readings from Last 1 Encounters:   20 36.6  C (97.9  F) (Oral)    Ht Readings from Last 1 Encounters:   20 1.918 m (6' 3.5\")      Wt Readings from Last 1 Encounters:   20 67.1 kg (148 lb)    Estimated body mass index is 18.25 kg/m  as calculated from the " "following:    Height as of this encounter: 1.918 m (6' 3.5\").    Weight as of this encounter: 67.1 kg (148 lb).     LDA:        No past medical history on file.   Past Surgical History:   Procedure Laterality Date     ARTHROSCOPY KNEE WITH DEBRIDEMENT JOINT, COMBINED Left 6/22/2018    Procedure: ARTHROSCOPY KNEE WITH DEBRIDEMENT JOINT;  Examination Under Anesthesia Left Knee, Left Knee Arthroscopy, Chondroplasty, Loose Body Removal  ;  Surgeon: Hoang Castro MD;  Location:  OR     CIRCUMCISION       ORTHOPEDIC SURGERY  9-2013    rt knee, open, tendon repair      Allergies   Allergen Reactions     Cefprozil      Penicillins      Sulfa Drugs      Sulfonamide antibiotics     Sulfamethoxazole      Trimethoprim      Amoxicillin Trihydrate Rash     Clavulanic Acid Potassium Rash        Anesthesia Evaluation     . Pt has had prior anesthetic. Type: General    No history of anesthetic complications          ROS/MED HX    ENT/Pulmonary:     (+)tobacco use, Past use , . .    Neurologic:  - neg neurologic ROS     Cardiovascular:  - neg cardiovascular ROS       METS/Exercise Tolerance:     Hematologic:  - neg hematologic  ROS       Musculoskeletal:   (+)  other musculoskeletal- knee joint hypermobility      GI/Hepatic:  - neg GI/hepatic ROS       Renal/Genitourinary:  - ROS Renal section negative       Endo:  - neg endo ROS       Psychiatric:  - neg psychiatric ROS       Infectious Disease:  - neg infectious disease ROS       Malignancy:      - no malignancy   Other:                         PHYSICAL EXAM:   Mental Status/Neuro: A/A/O   Airway: Facies: Feasible  Mallampati: I  Mouth/Opening: Full  TM distance: > 6 cm  Neck ROM: Full   Respiratory: Auscultation: CTAB     Resp. Rate: Normal     Resp. Effort: Normal      CV: Rhythm: Regular  Rate: Age appropriate  Heart: Normal Sounds  Edema: None   Comments:      Dental: Normal Dentition                Assessment:   ASA SCORE: 1    H&P: History and physical " reviewed and following examination; no interval change.   Smoking Status:  Non-Smoker/Unknown   NPO Status: NPO Appropriate     Plan:   Anes. Type:  General   Pre-Medication: Acetaminophen; Gabapentin   Induction:  IV (Standard)   Airway: LMA   Access/Monitoring: PIV   Maintenance: Balanced     Postop Plan:   Postop Pain: Opioids  Postop Sedation/Airway: Not planned  Disposition: Outpatient     PONV Management:   Adult Risk Factors:, Non-Smoker, Postop Opioids   Prevention: Ondansetron, Dexamethasone     CONSENT: Direct conversation   Plan and risks discussed with: Patient   Blood Products: N/a                   Hong Mccain DO

## 2020-09-29 NOTE — ANESTHESIA POSTPROCEDURE EVALUATION
Anesthesia POST Procedure Evaluation    Patient: Kulwant Santillan   MRN:     [id number] Gender:   male   Age:    25 year old :      1995        Preoperative Diagnosis: Loose body of right knee [M23.41]   Procedure(s):  Right knee Examination under anesthesia, knee arthroscopy, chondroplasty, loosy body removal   Postop Comments: No value filed.     Anesthesia Type: General       Disposition: Outpatient   Postop Pain Control: Uneventful            Sign Out: Well controlled pain   PONV: No   Neuro/Psych: Uneventful            Sign Out: Acceptable/Baseline neuro status   Airway/Respiratory: Uneventful            Sign Out: AIRWAY IN SITU/Resp. Support   CV/Hemodynamics: Uneventful            Sign Out: Acceptable CV status   Other NRE: NONE   DID A NON-ROUTINE EVENT OCCUR? No         Last Anesthesia Record Vitals:  CRNA VITALS  2020 0837 - 2020 0937      2020             Pulse:  55    SpO2:  98 %    Resp Rate (observed):  (!) 4          Last PACU Vitals:  Vitals Value Taken Time   BP 93/51 2020  9:17 AM   Temp 36.7  C (98  F) 2020  9:15 AM   Pulse 65 2020  9:17 AM   Resp 15 2020  9:17 AM   SpO2 100 % 2020  9:17 AM   Temp src     NIBP     Pulse     SpO2     Resp     Temp     Ht Rate     Temp 2     Vitals shown include unvalidated device data.      Electronically Signed By: Hong Mccain DO, 2020, 10:25 AM

## 2020-10-07 ENCOUNTER — TELEPHONE (OUTPATIENT)
Dept: ORTHOPEDICS | Facility: CLINIC | Age: 25
End: 2020-10-07

## 2020-10-07 NOTE — TELEPHONE ENCOUNTER
Called patient to discuss post-operative visit. He is 8 days s/p Right knee Examination under anesthesia, knee arthroscopy, chondroplasty, loosy body removal. He lives out of the [facility] area and will plan to see his PCP, Marina Stuart, for his 7-10 day wound check and suture removal appointment. She is within the [employer] system and will be able to see Dr. Castro's operative note. Patient will plan to see Dr. Castro again on 11/9/20 for his 6 week post-operative visit. He had no further questions at this time.  
Detail Level: Zone
Detail Level: Detailed

## 2020-10-08 DIAGNOSIS — Z98.890 S/P ARTHROSCOPY OF RIGHT KNEE: ICD-10-CM

## 2020-10-08 RX ORDER — OXYCODONE HYDROCHLORIDE 5 MG/1
5 TABLET ORAL 2 TIMES DAILY PRN
Qty: 15 TABLET | Refills: 0 | Status: SHIPPED | OUTPATIENT
Start: 2020-10-08 | End: 2020-11-12

## 2020-10-08 NOTE — TELEPHONE ENCOUNTER
DOS: 9/29/2020    Patient requests refill of postop medications. He states that he is having more pain this time with this side. He is only using them 1 to 2 times per day, particularly bedtime. Will pend to Dr. Castro for signature.

## 2020-10-08 NOTE — TELEPHONE ENCOUNTER
[facility] Call Center    Phone Message    May a detailed message be left on voicemail: yes     Reason for Call: Other: Patients mother called on his behalf, wanting to speak with august about getting more pain killers to help take the edge off. Patients mother stated he is still experiencing pain and swelling in the knee that was recently operated on and just wanted to see if that was possible. Please call patient back when able to discuss options.     Action Taken: Message routed to:  [facility] ([facility] ortho    Travel Screening: Not Applicable

## 2020-11-10 NOTE — PROGRESS NOTES
3  SUBJECTIVE:   CC: Kulwant Santillan is an 25 year old male who presents for preventive health visit.     Patient has been advised of split billing requirements and indicates understanding: Yes     Healthy Habits:    Do you get at least three servings of calcium containing foods daily (dairy, green leafy vegetables, etc.)? yes    Amount of exercise or daily activities, outside of work: 2 day(s) per week    Problems taking medications regularly No    Medication side effects: No    Have you had an eye exam in the past two years? yes    Do you see a dentist twice per year? yes    Do you have sleep apnea, excessive snoring or daytime drowsiness?no      He is feeling well and does not have any new concerns today.     Today's PHQ-2 Score:   PHQ-2 ( 1999 [employer]) 9/10/2020 7/27/2020   Q1: Little interest or pleasure in doing things 0 0   Q2: Feeling down, depressed or hopeless 0 0   PHQ-2 Score 0 0       Abuse: Current or Past(Physical, Sexual or Emotional)- No  Do you feel safe in your environment? Yes        Social History     Tobacco Use     Smoking status: Former Smoker     Smokeless tobacco: Never Used     Tobacco comment: no passive exposure   Substance Use Topics     Alcohol use: No     If you drink alcohol do you typically have >3 drinks per day or >7 drinks per week? No                      Last PSA: No results found for: PSA    Reviewed orders with patient. Reviewed health maintenance and updated orders accordingly - Yes  BP Readings from Last 3 Encounters:   11/12/20 110/62   09/29/20 101/59   09/10/20 104/58    Wt Readings from Last 3 Encounters:   11/12/20 67.6 kg (149 lb)   09/29/20 67.1 kg (148 lb)   09/10/20 67.3 kg (148 lb 4.8 oz)                  Patient Active Problem List   Diagnosis     Acne     ACP (advance care planning)     Knee joint hypermobility     Loose body of right knee     Past Surgical History:   Procedure Laterality Date     ARTHROSCOPY KNEE WITH DEBRIDEMENT JOINT, COMBINED Left 6/22/2018     Procedure: ARTHROSCOPY KNEE WITH DEBRIDEMENT JOINT;  Examination Under Anesthesia Left Knee, Left Knee Arthroscopy, Chondroplasty, Loose Body Removal  ;  Surgeon: Hoang Castro MD;  Location: [facility] OR     ARTHROSCOPY KNEE WITH DEBRIDEMENT JOINT, COMBINED Right 9/29/2020    Procedure: Right knee Examination under anesthesia, knee arthroscopy, chondroplasty, loosy body removal;  Surgeon: Hoang Castro MD;  Location: [facility] OR     CIRCUMCISION       ORTHOPEDIC SURGERY  9-2013    rt knee, open, tendon repair       Social History     Tobacco Use     Smoking status: Former Smoker     Smokeless tobacco: Never Used     Tobacco comment: no passive exposure   Substance Use Topics     Alcohol use: No     Family History   Problem Relation Age of Onset     Cancer Mother         nasal pharngeal     Thyroid Disease Mother         disease         No current outpatient medications on file.     Allergies   Allergen Reactions     Cefprozil      Penicillins      Sulfa Drugs      Sulfonamide antibiotics     Sulfamethoxazole      Trimethoprim      Amoxicillin Trihydrate Rash     Clavulanic Acid Potassium Rash     Recent Labs   Lab Test 09/10/20  0938 06/08/18  1407 07/07/16  1136   ALT  --   --  21   CR 0.79 0.82 0.79   GFRESTIMATED >90 >90 >90  Non African American GFR Calc     GFRESTBLACK >90 >90 >90  African American GFR Calc     POTASSIUM 3.8 4.4 3.4   TSH  --   --  1.97        Reviewed and updated as needed this visit by clinical staff  Tobacco  Allergies  Meds              Reviewed and updated as needed this visit by Provider                    ROS:  CONSTITUTIONAL: NEGATIVE for fever, chills, change in weight  INTEGUMENTARY/SKIN: NEGATIVE for worrisome rashes, moles or lesions  EYES: NEGATIVE for vision changes or irritation  ENT: NEGATIVE for ear, mouth and throat problems  RESP: NEGATIVE for significant cough or SOB  CV: NEGATIVE for chest pain, palpitations or peripheral edema  GI: NEGATIVE for  "nausea, abdominal pain, heartburn, or change in bowel habits   male: negative for dysuria, hematuria, decreased urinary stream, erectile dysfunction, urethral discharge  MUSCULOSKELETAL: chronic bilateral knee pain.    NEURO: NEGATIVE for weakness, dizziness or paresthesias  PSYCHIATRIC: NEGATIVE for changes in mood or affect    OBJECTIVE:   /62 (BP Location: Left arm, Patient Position: Chair, Cuff Size: Adult Regular)   Pulse 92   Temp 97.7  F (36.5  C) (Temporal)   Ht 1.905 m (6' 3\")   Wt 67.6 kg (149 lb)   SpO2 99%   BMI 18.62 kg/m    EXAM:  GENERAL: healthy, alert and no distress  EYES: Eyes grossly normal to inspection, PERRL and conjunctivae and sclerae normal  HENT: ear canals and TM's normal, nose and mouth without ulcers or lesions  NECK: no adenopathy, no asymmetry, masses, or scars and thyroid normal to palpation  RESP: lungs clear to auscultation - no rales, rhonchi or wheezes  CV: regular rate and rhythm, normal S1 S2, no S3 or S4, no murmur, click or rub, no peripheral edema and peripheral pulses strong  ABDOMEN: soft, nontender, no hepatosplenomegaly, no masses and bowel sounds normal  MS: no gross musculoskeletal defects noted, no edema  SKIN: no suspicious lesions or rashes  NEURO: Normal strength and tone, mentation intact and speech normal  PSYCH: mentation appears normal, affect normal/bright        ASSESSMENT/PLAN:   1. Routine general medical examination at a health care facility  Exam completed   - Lipid Profile  - TSH with free T4 reflex    2. Need for vaccination  adacel updated today.         Patient has been advised of split billing requirements and indicates understanding: Yes  COUNSELING:  Reviewed preventive health counseling, as reflected in patient instructions       Regular exercise       Healthy diet/nutrition       Vision screening       Hearing screening       Immunizations    Vaccinated for: TDAP             Safe sex practices/STD prevention    Estimated body mass " "index is 18.62 kg/m  as calculated from the following:    Height as of this encounter: 1.905 m (6' 3\").    Weight as of this encounter: 67.6 kg (149 lb).        He reports that he has quit smoking. He has never used smokeless tobacco.      Counseling Resources:  ATP IV Guidelines  Pooled Cohorts Equation Calculator  FRAX Risk Assessment  ICSI Preventive Guidelines  Dietary Guidelines for Americans, 2010  USDA's MyPlate  ASA Prophylaxis  Lung CA Screening    Marina Stuart NP  [facility]  "

## 2020-11-10 NOTE — PATIENT INSTRUCTIONS
ASSESSMENT/PLAN:   1. Routine general medical examination at a health care facility  Exam completed   - Lipid Profile  - TSH with free T4 reflex    2. Need for vaccination  adacel updated today.     Follow-up as needed     Marina Stuart,   Certified Adult Nurse Practitioner  [phone]        Preventive Health Recommendations  Male Ages 21 - 25     Yearly exam:             See your health care provider every year in order to  o   Review health changes.   o   Discuss preventive care.    o   Review your medicines if your doctor has prescribed any.    You should be tested each year for STDs (sexually transmitted diseases).     Talk to your provider about cholesterol testing.      If you are at risk for diabetes, you should have a diabetes test (fasting glucose).    Shots: Get a flu shot each year. Get a tetanus shot every 10 years.     Nutrition:    Eat at least 5 servings of fruits and vegetables daily.     Eat whole-grain bread, whole-wheat pasta and brown rice instead of white grains and rice.     Get adequate calcium and Vitamin D.     Lifestyle    Exercise for at least 150 minutes a week (30 minutes a day, 5 days a week). This will help you control your weight and prevent disease.     Limit alcohol to one drink per day.     No smoking.     Wear sunscreen to prevent skin cancer.     See your dentist every six months for an exam and cleaning.

## 2020-11-12 ENCOUNTER — OFFICE VISIT (OUTPATIENT)
Dept: FAMILY MEDICINE | Facility: OTHER | Age: 25
End: 2020-11-12
Attending: NURSE PRACTITIONER
Payer: COMMERCIAL

## 2020-11-12 VITALS
HEART RATE: 92 BPM | WEIGHT: 149 LBS | DIASTOLIC BLOOD PRESSURE: 62 MMHG | BODY MASS INDEX: 18.53 KG/M2 | OXYGEN SATURATION: 99 % | SYSTOLIC BLOOD PRESSURE: 110 MMHG | TEMPERATURE: 97.7 F | HEIGHT: 75 IN

## 2020-11-12 DIAGNOSIS — Z00.00 ROUTINE GENERAL MEDICAL EXAMINATION AT A HEALTH CARE FACILITY: Primary | ICD-10-CM

## 2020-11-12 DIAGNOSIS — Z23 NEED FOR VACCINATION: ICD-10-CM

## 2020-11-12 LAB
CHOLEST SERPL-MCNC: 149 MG/DL
HDLC SERPL-MCNC: 56 MG/DL
LDLC SERPL CALC-MCNC: 70 MG/DL
NONHDLC SERPL-MCNC: 93 MG/DL
TRIGL SERPL-MCNC: 116 MG/DL
TSH SERPL DL<=0.005 MIU/L-ACNC: 1.81 MU/L (ref 0.4–4)

## 2020-11-12 PROCEDURE — 84443 ASSAY THYROID STIM HORMONE: CPT | Performed by: NURSE PRACTITIONER

## 2020-11-12 PROCEDURE — 90471 IMMUNIZATION ADMIN: CPT | Performed by: NURSE PRACTITIONER

## 2020-11-12 PROCEDURE — 90715 TDAP VACCINE 7 YRS/> IM: CPT | Performed by: NURSE PRACTITIONER

## 2020-11-12 PROCEDURE — 80061 LIPID PANEL: CPT | Performed by: NURSE PRACTITIONER

## 2020-11-12 PROCEDURE — 36415 COLL VENOUS BLD VENIPUNCTURE: CPT | Performed by: NURSE PRACTITIONER

## 2020-11-12 PROCEDURE — 99395 PREV VISIT EST AGE 18-39: CPT | Mod: 25 | Performed by: NURSE PRACTITIONER

## 2020-11-12 ASSESSMENT — PAIN SCALES - GENERAL: PAINLEVEL: NO PAIN (0)

## 2020-11-12 ASSESSMENT — MIFFLIN-ST. JEOR: SCORE: 1751.49

## 2020-11-12 NOTE — NURSING NOTE
"Chief Complaint   Patient presents with     Physical       Initial /62 (BP Location: Left arm, Patient Position: Chair, Cuff Size: Adult Regular)   Pulse 92   Temp 97.7  F (36.5  C) (Temporal)   Ht 1.905 m (6' 3\")   Wt 67.6 kg (149 lb)   SpO2 99%   BMI 18.62 kg/m   Estimated body mass index is 18.62 kg/m  as calculated from the following:    Height as of this encounter: 1.905 m (6' 3\").    Weight as of this encounter: 67.6 kg (149 lb).  Medication Reconciliation: complete  Elaina Ash LPN    "

## 2020-12-20 ENCOUNTER — HEALTH MAINTENANCE LETTER (OUTPATIENT)
Age: 25
End: 2020-12-20

## 2021-10-03 ENCOUNTER — HEALTH MAINTENANCE LETTER (OUTPATIENT)
Age: 26
End: 2021-10-03

## 2022-01-23 ENCOUNTER — HEALTH MAINTENANCE LETTER (OUTPATIENT)
Age: 27
End: 2022-01-23

## 2022-06-10 ENCOUNTER — HOSPITAL ENCOUNTER (EMERGENCY)
Facility: HOSPITAL | Age: 27
Discharge: HOME | End: 2022-06-10
Attending: NURSE PRACTITIONER | Admitting: NURSE PRACTITIONER
Payer: MEDICAID

## 2022-06-10 ENCOUNTER — APPOINTMENT (OUTPATIENT)
Dept: GENERAL RADIOLOGY | Facility: HOSPITAL | Age: 27
End: 2022-06-10
Attending: NURSE PRACTITIONER
Payer: MEDICAID

## 2022-06-10 VITALS
RESPIRATION RATE: 16 BRPM | DIASTOLIC BLOOD PRESSURE: 66 MMHG | TEMPERATURE: 99 F | OXYGEN SATURATION: 97 % | HEART RATE: 95 BPM | SYSTOLIC BLOOD PRESSURE: 102 MMHG

## 2022-06-10 DIAGNOSIS — S29.012A UPPER BACK STRAIN, INITIAL ENCOUNTER: ICD-10-CM

## 2022-06-10 PROCEDURE — G0463 HOSPITAL OUTPT CLINIC VISIT: HCPCS

## 2022-06-10 PROCEDURE — 99213 OFFICE O/P EST LOW 20 MIN: CPT | Performed by: NURSE PRACTITIONER

## 2022-06-10 PROCEDURE — 72070 X-RAY EXAM THORAC SPINE 2VWS: CPT

## 2022-06-10 RX ORDER — IBUPROFEN 600 MG/1
600 TABLET, FILM COATED ORAL ONCE
Status: DISCONTINUED | OUTPATIENT
Start: 2022-06-10 | End: 2022-06-10 | Stop reason: HOSPADM

## 2022-06-10 NOTE — ED TRIAGE NOTES
Back Pain       Duration: 1 day        Specific cause: lifting    Description:   Location of pain: middle of back middle of back  Character of pain: sharp  Pain radiation:none  New numbness or weakness in legs, not attributed to pain:  no     Intensity: Currently 6/10    History:   Pain interferes with job: YES  History of back problems: no prior back problems  Any previous MRI or X-rays: None  Sees a specialist for back pain:  No  Therapies tried without relief: none    Alleviating factors:   Improved by: none      Precipitating factors:  Worsened by: Lifting, Bending, Sitting, Walking and Coughing

## 2022-06-10 NOTE — ED PROVIDER NOTES
History     Chief Complaint   Patient presents with     Back Pain     HPI   History of presenting illness given by patient.    Kulwant Santillan is a 26 year old male who who presents for evaluation of upper back pain that occurred just prior to arrival after lifting a bike overhead.  Directly after lifting his bike overhead he developed instant midline back pain to the upper back along with muscular pain to both sides.  He has not taken anything over-the-counter for relief of symptoms.  He rates his pain an 8/10.     Allergies:  Allergies   Allergen Reactions     Cefprozil      Penicillins      Sulfa Drugs      Sulfonamide antibiotics     Sulfamethoxazole      Trimethoprim      Amoxicillin Trihydrate Rash     Clavulanic Acid Potassium Rash       Problem List:    Patient Active Problem List    Diagnosis Date Noted     Loose body of right knee 08/18/2020     Priority: Medium     Added automatically from request for surgery [id number]       Knee joint hypermobility 10/04/2017     Priority: Medium     ACP (advance care planning) 07/07/2016     Priority: Medium     Advance Care Planning 7/7/2016: ACP Review of Chart / Resources Provided:  Reviewed chart for advance care plan.  Kulwant Santillan has no plan or code status on file. Discussed available resources and declined information. Confirmed code status reflects current choices pending further ACP discussions.  Confirmed/documented legally designated decision makers.  Added by Priti Pearson 06/19/2014     Priority: Medium        Past Medical History:    History reviewed. No pertinent past medical history.    Past Surgical History:    Past Surgical History:   Procedure Laterality Date     ARTHROSCOPY KNEE WITH DEBRIDEMENT JOINT, COMBINED Left 6/22/2018    Procedure: ARTHROSCOPY KNEE WITH DEBRIDEMENT JOINT;  Examination Under Anesthesia Left Knee, Left Knee Arthroscopy, Chondroplasty, Loose Body Removal  ;  Surgeon: Hoang Castro MD;  Location:  [facility] OR     ARTHROSCOPY KNEE WITH DEBRIDEMENT JOINT, COMBINED Right 9/29/2020    Procedure: Right knee Examination under anesthesia, knee arthroscopy, chondroplasty, loosy body removal;  Surgeon: Hoang Castro MD;  Location:  OR     CIRCUMCISION       ORTHOPEDIC SURGERY  9-2013    rt knee, open, tendon repair       Family History:    Family History   Problem Relation Age of Onset     Cancer Mother         nasal pharngeal     Thyroid Disease Mother         disease       Social History:  Marital Status:  Single [1]  Social History     Tobacco Use     Smoking status: Former Smoker     Smokeless tobacco: Never Used     Tobacco comment: no passive exposure   Substance Use Topics     Alcohol use: No     Drug use: No        Medications:    No current outpatient medications on file.        Review of Systems   Constitutional: Negative.    HENT: Negative.    Eyes: Negative.    Respiratory: Negative.    Cardiovascular: Negative.    Gastrointestinal: Negative.    Endocrine: Negative.    Genitourinary: Negative.    Musculoskeletal: Positive for back pain.   Skin: Negative.    Allergic/Immunologic: Negative.    Neurological: Negative.    Hematological: Negative.    Psychiatric/Behavioral: Negative.        Physical Exam   BP: 102/66  Pulse: 95  Temp: 99  F (37.2  C)  Resp: 16  SpO2: 97 %      Physical Exam  Vitals and nursing note reviewed.   Constitutional:       General: He is not in acute distress.     Appearance: Normal appearance. He is normal weight. He is not ill-appearing.   HENT:      Head: Normocephalic and atraumatic.      Nose: Nose normal.      Mouth/Throat:      Mouth: Mucous membranes are moist.      Pharynx: Oropharynx is clear.   Eyes:      Extraocular Movements: Extraocular movements intact.      Conjunctiva/sclera: Conjunctivae normal.      Pupils: Pupils are equal, round, and reactive to light.   Cardiovascular:      Rate and Rhythm: Normal rate and regular rhythm.      Pulses: Normal pulses.       Heart sounds: Normal heart sounds.   Pulmonary:      Effort: Pulmonary effort is normal.      Breath sounds: Normal breath sounds.   Abdominal:      General: Abdomen is flat. Bowel sounds are normal.      Palpations: Abdomen is soft.   Musculoskeletal:         General: Tenderness and signs of injury present.      Cervical back: Normal range of motion.   Skin:     General: Skin is warm and dry.   Neurological:      General: No focal deficit present.      Mental Status: He is alert and oriented to person, place, and time.   Psychiatric:         Mood and Affect: Mood normal.         Behavior: Behavior normal.         Thought Content: Thought content normal.         Judgment: Judgment normal.         ED Course         Assessments & Plan (with Medical Decision Making)   26-year-old male presents with upper back pain that occurred just prior to arrival after lifting a bike overhead.  Patient vitally stable does not appear to be in acute distress.      On assessment he has pain and tenderness with palpation to the midline mid thoracic region.  He also has pain to the muscular regions bilateral sides.  He denies any tingling, or numbness, loss of sensation, or disuse.  He is able to perform leg lifts without hesitation or pain.  Negative for neurologic deficit. he is able to bend and touch toes has worsening pain on his way up to stand straight at area of pain.    Plan: Thoracic spine x-ray 2 views    Differential diagnosis includes and is not limited to:  Vertebral fracture  Pneumothorax  Back sprain  Back strain  Herniated disc      Discharge plan discussed with patient to ice every 2-3 hours for no longer than 20 minutes, rest, and use Tylenol or ibuprofen as needed for pain.  If there is no improvement he will follow-up with his primary care provider for further imaging and evaluation.    I have reviewed the nursing notes.    I have reviewed the findings, diagnosis, plan and need for follow up with the  patient.      There are no discharge medications for this patient.      Final diagnoses:   Upper back strain, initial encounter       6/10/2022   [facility]     Elma Granger APRN CNP  06/19/22 1958

## 2022-06-10 NOTE — DISCHARGE INSTRUCTIONS
Thank you for choosing [facility] for your healthcare needs today.  For your back strain, please rest, do not perform any strenuous activities over the next 2 weeks, use Tylenol or ibuprofen for pain, ice every 2-3 hours for no longer than 20 minutes.  If your symptoms worsen or you develop any new concerning symptoms please feel free to return or be seen in the emergency room.  Follow-up next week with your primary care provider for reevaluation.  Thank you

## 2022-06-19 ASSESSMENT — ENCOUNTER SYMPTOMS
ENDOCRINE NEGATIVE: 1
EYES NEGATIVE: 1
GASTROINTESTINAL NEGATIVE: 1
NEUROLOGICAL NEGATIVE: 1
CONSTITUTIONAL NEGATIVE: 1
PSYCHIATRIC NEGATIVE: 1
HEMATOLOGIC/LYMPHATIC NEGATIVE: 1
BACK PAIN: 1
CARDIOVASCULAR NEGATIVE: 1
RESPIRATORY NEGATIVE: 1
ALLERGIC/IMMUNOLOGIC NEGATIVE: 1

## 2022-09-04 ENCOUNTER — HEALTH MAINTENANCE LETTER (OUTPATIENT)
Age: 27
End: 2022-09-04

## 2023-04-29 ENCOUNTER — HEALTH MAINTENANCE LETTER (OUTPATIENT)
Age: 28
End: 2023-04-29

## 2023-08-09 ENCOUNTER — TELEPHONE (OUTPATIENT)
Dept: FAMILY MEDICINE | Facility: OTHER | Age: 28
End: 2023-08-09

## 2023-08-09 NOTE — TELEPHONE ENCOUNTER
Talked to patient does not have health insurance removed camille as pcp per request he will make an establish care when get insurance

## 2024-04-20 ENCOUNTER — APPOINTMENT (OUTPATIENT)
Dept: CT IMAGING | Facility: HOSPITAL | Age: 29
End: 2024-04-20
Attending: FAMILY MEDICINE
Payer: MEDICAID

## 2024-04-20 ENCOUNTER — HOSPITAL ENCOUNTER (EMERGENCY)
Facility: HOSPITAL | Age: 29
Discharge: HOME | End: 2024-04-21
Attending: FAMILY MEDICINE | Admitting: FAMILY MEDICINE
Payer: MEDICAID

## 2024-04-20 DIAGNOSIS — E87.6 HYPOKALEMIA: ICD-10-CM

## 2024-04-20 DIAGNOSIS — K52.9 GASTROENTERITIS: ICD-10-CM

## 2024-04-20 LAB
ACANTHOCYTES BLD QL SMEAR: ABNORMAL
ALBUMIN SERPL BCG-MCNC: 4.6 G/DL (ref 3.5–5.2)
ALP SERPL-CCNC: 50 U/L (ref 40–150)
ALT SERPL W P-5'-P-CCNC: 16 U/L (ref 0–70)
ANION GAP SERPL CALCULATED.3IONS-SCNC: 10 MMOL/L (ref 7–15)
AST SERPL W P-5'-P-CCNC: 20 U/L (ref 0–45)
AUER BODIES BLD QL SMEAR: ABNORMAL
BASO STIPL BLD QL SMEAR: ABNORMAL
BASOPHILS # BLD AUTO: 0 10E3/UL (ref 0–0.2)
BASOPHILS NFR BLD AUTO: 0 %
BILIRUB DIRECT SERPL-MCNC: <0.2 MG/DL (ref 0–0.3)
BILIRUB SERPL-MCNC: 0.5 MG/DL
BITE CELLS BLD QL SMEAR: ABNORMAL
BLISTER CELLS BLD QL SMEAR: ABNORMAL
BUN SERPL-MCNC: 12.3 MG/DL (ref 6–20)
BURR CELLS BLD QL SMEAR: ABNORMAL
CALCIUM SERPL-MCNC: 8.7 MG/DL (ref 8.6–10)
CHLORIDE SERPL-SCNC: 102 MMOL/L (ref 98–107)
CREAT SERPL-MCNC: 0.73 MG/DL (ref 0.67–1.17)
CRP SERPL-MCNC: <3 MG/L
DACRYOCYTES BLD QL SMEAR: ABNORMAL
DEPRECATED HCO3 PLAS-SCNC: 30 MMOL/L (ref 22–29)
EGFRCR SERPLBLD CKD-EPI 2021: >90 ML/MIN/1.73M2
ELLIPTOCYTES BLD QL SMEAR: ABNORMAL
EOSINOPHIL # BLD AUTO: 0.2 10E3/UL (ref 0–0.7)
EOSINOPHIL NFR BLD AUTO: 2 %
ERYTHROCYTE [DISTWIDTH] IN BLOOD BY AUTOMATED COUNT: 12 % (ref 10–15)
FRAGMENTS BLD QL SMEAR: ABNORMAL
GIANT PLATELETS BLD QL SMEAR: ABNORMAL
GLUCOSE SERPL-MCNC: 88 MG/DL (ref 70–99)
HCT VFR BLD AUTO: 45 % (ref 40–53)
HGB BLD-MCNC: 15.6 G/DL (ref 13.3–17.7)
HGB C CRYSTALS: ABNORMAL
HOWELL-JOLLY BOD BLD QL SMEAR: ABNORMAL
IMM GRANULOCYTES # BLD: 0 10E3/UL
IMM GRANULOCYTES NFR BLD: 0 %
LACTATE SERPL-SCNC: 1.5 MMOL/L (ref 0.7–2)
LIPASE SERPL-CCNC: 15 U/L (ref 13–60)
LYMPHOCYTES # BLD AUTO: 2.6 10E3/UL (ref 0.8–5.3)
LYMPHOCYTES NFR BLD AUTO: 38 %
MCH RBC QN AUTO: 29.7 PG (ref 26.5–33)
MCHC RBC AUTO-ENTMCNC: 34.7 G/DL (ref 31.5–36.5)
MCV RBC AUTO: 86 FL (ref 78–100)
MONOCYTES # BLD AUTO: 0.9 10E3/UL (ref 0–1.3)
MONOCYTES NFR BLD AUTO: 13 %
NEUTROPHILS # BLD AUTO: 3.2 10E3/UL (ref 1.6–8.3)
NEUTROPHILS NFR BLD AUTO: 46 %
NEUTS HYPERSEG BLD QL SMEAR: ABNORMAL
NRBC # BLD AUTO: 0 10E3/UL
NRBC BLD AUTO-RTO: 0 /100
PATH REV: ABNORMAL
PLAT MORPH BLD: ABNORMAL
PLATELET # BLD AUTO: 339 10E3/UL (ref 150–450)
POLYCHROMASIA BLD QL SMEAR: ABNORMAL
POTASSIUM SERPL-SCNC: 3.3 MMOL/L (ref 3.4–5.3)
PROT SERPL-MCNC: 7.3 G/DL (ref 6.4–8.3)
RBC # BLD AUTO: 5.26 10E6/UL (ref 4.4–5.9)
RBC AGGLUT BLD QL: ABNORMAL
RBC MORPH BLD: ABNORMAL
ROULEAUX BLD QL SMEAR: ABNORMAL
SICKLE CELLS BLD QL SMEAR: ABNORMAL
SMUDGE CELLS BLD QL SMEAR: ABNORMAL
SODIUM SERPL-SCNC: 142 MMOL/L (ref 135–145)
SPHEROCYTES BLD QL SMEAR: ABNORMAL
STOMATOCYTES BLD QL SMEAR: ABNORMAL
TARGETS BLD QL SMEAR: ABNORMAL
TOXIC GRANULES BLD QL SMEAR: ABNORMAL
VARIANT LYMPHS BLD QL SMEAR: PRESENT
WBC # BLD AUTO: 7 10E3/UL (ref 4–11)

## 2024-04-20 PROCEDURE — 82248 BILIRUBIN DIRECT: CPT | Performed by: FAMILY MEDICINE

## 2024-04-20 PROCEDURE — 86140 C-REACTIVE PROTEIN: CPT | Performed by: FAMILY MEDICINE

## 2024-04-20 PROCEDURE — 250N000011 HC RX IP 250 OP 636: Performed by: FAMILY MEDICINE

## 2024-04-20 PROCEDURE — 99284 EMERGENCY DEPT VISIT MOD MDM: CPT | Performed by: FAMILY MEDICINE

## 2024-04-20 PROCEDURE — 83690 ASSAY OF LIPASE: CPT | Performed by: FAMILY MEDICINE

## 2024-04-20 PROCEDURE — 96375 TX/PRO/DX INJ NEW DRUG ADDON: CPT

## 2024-04-20 PROCEDURE — 83605 ASSAY OF LACTIC ACID: CPT | Performed by: FAMILY MEDICINE

## 2024-04-20 PROCEDURE — 258N000003 HC RX IP 258 OP 636: Performed by: FAMILY MEDICINE

## 2024-04-20 PROCEDURE — 74177 CT ABD & PELVIS W/CONTRAST: CPT

## 2024-04-20 PROCEDURE — 96374 THER/PROPH/DIAG INJ IV PUSH: CPT | Mod: XU

## 2024-04-20 PROCEDURE — 36415 COLL VENOUS BLD VENIPUNCTURE: CPT | Performed by: FAMILY MEDICINE

## 2024-04-20 PROCEDURE — 99285 EMERGENCY DEPT VISIT HI MDM: CPT | Mod: 25

## 2024-04-20 PROCEDURE — 80053 COMPREHEN METABOLIC PANEL: CPT | Performed by: FAMILY MEDICINE

## 2024-04-20 PROCEDURE — 85025 COMPLETE CBC W/AUTO DIFF WBC: CPT | Performed by: FAMILY MEDICINE

## 2024-04-20 PROCEDURE — 96361 HYDRATE IV INFUSION ADD-ON: CPT

## 2024-04-20 RX ORDER — ONDANSETRON 2 MG/ML
4 INJECTION INTRAMUSCULAR; INTRAVENOUS EVERY 30 MIN PRN
Status: DISCONTINUED | OUTPATIENT
Start: 2024-04-20 | End: 2024-04-21 | Stop reason: HOSPADM

## 2024-04-20 RX ORDER — HYDROMORPHONE HYDROCHLORIDE 1 MG/ML
0.5 INJECTION, SOLUTION INTRAMUSCULAR; INTRAVENOUS; SUBCUTANEOUS EVERY 30 MIN PRN
Status: DISCONTINUED | OUTPATIENT
Start: 2024-04-20 | End: 2024-04-21 | Stop reason: HOSPADM

## 2024-04-20 RX ORDER — IOPAMIDOL 755 MG/ML
74 INJECTION, SOLUTION INTRAVASCULAR ONCE
Status: COMPLETED | OUTPATIENT
Start: 2024-04-20 | End: 2024-04-20

## 2024-04-20 RX ADMIN — HYDROMORPHONE HYDROCHLORIDE 0.5 MG: 1 INJECTION, SOLUTION INTRAMUSCULAR; INTRAVENOUS; SUBCUTANEOUS at 23:16

## 2024-04-20 RX ADMIN — SODIUM CHLORIDE 1000 ML: 9 INJECTION, SOLUTION INTRAVENOUS at 23:15

## 2024-04-20 RX ADMIN — ONDANSETRON 4 MG: 2 INJECTION INTRAMUSCULAR; INTRAVENOUS at 23:15

## 2024-04-20 RX ADMIN — IOPAMIDOL 74 ML: 755 INJECTION, SOLUTION INTRAVENOUS at 23:46

## 2024-04-20 ASSESSMENT — COLUMBIA-SUICIDE SEVERITY RATING SCALE - C-SSRS
1. IN THE PAST MONTH, HAVE YOU WISHED YOU WERE DEAD OR WISHED YOU COULD GO TO SLEEP AND NOT WAKE UP?: NO
2. HAVE YOU ACTUALLY HAD ANY THOUGHTS OF KILLING YOURSELF IN THE PAST MONTH?: NO
6. HAVE YOU EVER DONE ANYTHING, STARTED TO DO ANYTHING, OR PREPARED TO DO ANYTHING TO END YOUR LIFE?: NO

## 2024-04-20 ASSESSMENT — ACTIVITIES OF DAILY LIVING (ADL): ADLS_ACUITY_SCORE: 35

## 2024-04-21 VITALS
RESPIRATION RATE: 16 BRPM | SYSTOLIC BLOOD PRESSURE: 113 MMHG | TEMPERATURE: 98.7 F | OXYGEN SATURATION: 98 % | DIASTOLIC BLOOD PRESSURE: 71 MMHG | HEART RATE: 70 BPM

## 2024-04-21 LAB
HOLD SPECIMEN: NORMAL

## 2024-04-21 PROCEDURE — 96376 TX/PRO/DX INJ SAME DRUG ADON: CPT

## 2024-04-21 PROCEDURE — 250N000011 HC RX IP 250 OP 636: Performed by: FAMILY MEDICINE

## 2024-04-21 PROCEDURE — 250N000013 HC RX MED GY IP 250 OP 250 PS 637: Performed by: FAMILY MEDICINE

## 2024-04-21 RX ORDER — POTASSIUM CHLORIDE 1500 MG/1
20 TABLET, EXTENDED RELEASE ORAL ONCE
Status: COMPLETED | OUTPATIENT
Start: 2024-04-21 | End: 2024-04-21

## 2024-04-21 RX ORDER — ONDANSETRON 4 MG/1
4 TABLET, ORALLY DISINTEGRATING ORAL EVERY 8 HOURS PRN
Qty: 10 TABLET | Refills: 0 | Status: SHIPPED | OUTPATIENT
Start: 2024-04-21

## 2024-04-21 RX ADMIN — POTASSIUM CHLORIDE 20 MEQ: 1500 TABLET, EXTENDED RELEASE ORAL at 01:33

## 2024-04-21 RX ADMIN — HYDROMORPHONE HYDROCHLORIDE 0.5 MG: 1 INJECTION, SOLUTION INTRAMUSCULAR; INTRAVENOUS; SUBCUTANEOUS at 01:35

## 2024-04-21 ASSESSMENT — ACTIVITIES OF DAILY LIVING (ADL): ADLS_ACUITY_SCORE: 35

## 2024-04-21 NOTE — ED TRIAGE NOTES
Pt presents to triage with c/o worsening abd pain over the last day. Hasn't been able to eat much all day, pt can not sit in triage, esteban, states moving makes it ,much worse. Pt states he doent feel sick but something is wrong. Pt reports vomiting and diarrhea for a day or two last week after a meal. But felt well after that.   Pt has all organs, reports x 3 lose, brown  stools today.      Triage Assessment (Adult)       Row Name 04/20/24 [id number]          Skin Circulation/Temperature WDL    Skin Circulation/Temperature WDL WDL        Cardiac WDL    Cardiac WDL WDL

## 2024-04-21 NOTE — ED NOTES
PO challenge with water, apple juice and crackers given. Pt has no nausea at this time, some pain rate 4/10. Declines any PRN pain

## 2024-04-21 NOTE — ED PROVIDER NOTES
History     Chief Complaint   Patient presents with    Abdominal Pain     HPI  Kulwant Santillan is a 28 year old male who presented to the ER with a chief complaint of abdominal pain and diarrhea started yesterday.  Constant abdominal cramping, 8 out of 10 worse with movement.  Better when he stays still.  Associate with nausea but no vomiting.  Has 3 loose stool today.  Denies any blood in the vomit urine or stool.  Denies any urinary symptoms.  Denies any suspicious food or drink.  No sick contact.  Stated he had vomiting and diarrhea for 2 days a week ago that has resolved.  Denies any fever or chills.  Denies any other concern or complaint.  Denies any cough sore throat or nasal congestion.  Denies any chest pain or shortness of breath.    Allergies:  Allergies   Allergen Reactions    Cefprozil     Penicillins     Sulfa Antibiotics      Sulfonamide antibiotics    Sulfamethoxazole     Trimethoprim     Amoxicillin Trihydrate Rash    Clavulanic Acid Potassium Rash       Problem List:    Patient Active Problem List    Diagnosis Date Noted    Loose body of right knee 08/18/2020     Priority: Medium     Added automatically from request for surgery [id number]      Knee joint hypermobility 10/04/2017     Priority: Medium    ACP (advance care planning) 07/07/2016     Priority: Medium     Advance Care Planning 7/7/2016: ACP Review of Chart / Resources Provided:  Reviewed chart for advance care plan.  Kulwant Santillan has no plan or code status on file. Discussed available resources and declined information. Confirmed code status reflects current choices pending further ACP discussions.  Confirmed/documented legally designated decision makers.  Added by Priti Pearson 06/19/2014     Priority: Medium        Past Medical History:    No past medical history on file.    Past Surgical History:    Past Surgical History:   Procedure Laterality Date    ARTHROSCOPY KNEE WITH DEBRIDEMENT JOINT, COMBINED Left 6/22/2018     Procedure: ARTHROSCOPY KNEE WITH DEBRIDEMENT JOINT;  Examination Under Anesthesia Left Knee, Left Knee Arthroscopy, Chondroplasty, Loose Body Removal  ;  Surgeon: Hoang Castro MD;  Location: [facility]    ARTHROSCOPY KNEE WITH DEBRIDEMENT JOINT, COMBINED Right 9/29/2020    Procedure: Right knee Examination under anesthesia, knee arthroscopy, chondroplasty, loosy body removal;  Surgeon: Hoang Castro MD;  Location: [facility]    CIRCUMCISION      ORTHOPEDIC SURGERY  9-2013    rt knee, open, tendon repair       Family History:    Family History   Problem Relation Age of Onset    Cancer Mother         nasal pharngeal    Thyroid Disease Mother         disease       Social History:  Marital Status:  Single [1]  Social History     Tobacco Use    Smoking status: Former    Smokeless tobacco: Never    Tobacco comments:     no passive exposure   Substance Use Topics    Alcohol use: No    Drug use: No        Medications:    ondansetron (ZOFRAN ODT) 4 MG ODT tab          Review of Systems   All other systems reviewed and are negative.      Physical Exam   BP: 130/78  Pulse: 91  Temp: 99.9  F (37.7  C)  Resp: 18  SpO2: 99 %      Physical Exam  Constitutional:       General: He is not in acute distress.     Appearance: He is not ill-appearing, toxic-appearing or diaphoretic.   HENT:      Head: Atraumatic.      Nose: Nose normal. No congestion or rhinorrhea.   Eyes:      General: No scleral icterus.        Left eye: No discharge.      Extraocular Movements: Extraocular movements intact.      Conjunctiva/sclera: Conjunctivae normal.      Pupils: Pupils are equal, round, and reactive to light.   Neck:      Vascular: No carotid bruit.   Cardiovascular:      Rate and Rhythm: Normal rate and regular rhythm.      Heart sounds: Normal heart sounds. No murmur heard.  Pulmonary:      Effort: Pulmonary effort is normal. No respiratory distress.      Breath sounds: Normal breath sounds. No stridor. No wheezing, rhonchi  or rales.   Chest:      Chest wall: No tenderness.   Abdominal:      General: Bowel sounds are normal. There is no distension.      Palpations: Abdomen is soft. There is no mass.      Tenderness: There is abdominal tenderness. There is no right CVA tenderness, left CVA tenderness, guarding or rebound.      Hernia: No hernia is present.      Comments: Diffuse abdominal tenderness   Musculoskeletal:         General: No swelling, tenderness, deformity or signs of injury.      Cervical back: Normal range of motion and neck supple. No rigidity or tenderness.      Right lower leg: No edema.      Left lower leg: No edema.   Lymphadenopathy:      Cervical: No cervical adenopathy.   Skin:     General: Skin is warm.      Coloration: Skin is not jaundiced or pale.      Findings: No bruising, erythema (.di), lesion or rash.   Neurological:      General: No focal deficit present.      Mental Status: He is oriented to person, place, and time. Mental status is at baseline.      Cranial Nerves: No cranial nerve deficit.      Sensory: No sensory deficit.      Motor: No weakness.      Coordination: Coordination normal.      Gait: Gait normal.      Deep Tendon Reflexes: Reflexes normal.   Psychiatric:         Mood and Affect: Mood normal.         ED Course      Patient was seen and examined shortly after arrival.  Stable.  Given 1 L normal saline bolus, 4 mg IV Zofran 0.5 mg IV Dilaudid.  Lab and imaging reviewed.  No significant acute abnormalities.  Potassium 3.3.  Given 20 mill equivalent potassium chloride.  Advised to eat more banana and fresh fruits and recheck potassium level in a few days.  Otherwise no significant acute abnormalities.  CT shows possible enteritis.  Broad differential diagnosis was considered including but not limited to gastroenteritis, colitis, diverticulitis, appendicitis, ischemic bowel, bowel obstruction, pyelonephritis, cholecystitis, cholelithiasis, hepatitis, pancreatitis, cystitis, etc.  Again lab and  imaging reassuring.  Symptom markedly improved.  Abdominal exam is benign.  Nonsurgical abdomen and no ominous sign.  Source is most likely gastroenteritis.  Patient stated his symptom improved.  Given a prescription for Zofran.  Advised to    Rest and stay well-hydrated  Alternate Tylenol and ibuprofen for pain and discomfort or fever  Close follow-up with PCP  Come back for any concern or any worsening symptoms  Patient agrees with the plan.  Stable for discharge  Procedures                  Results for orders placed or performed during the hospital encounter of 04/20/24 (from the past 24 hour(s))   [facility] Draw    Narrative    The following orders were created for panel order [facility] Draw.  Procedure                               Abnormality         Status                     ---------                               -----------         ------                     Extra Blue Top Tube[[id number]]                              Final result               Extra Red Top Tube[[id number]]                               Final result               Extra Green Top (Lithium...[[id number]]                      Final result               Extra Purple Top Tube[[id number]]                            Final result               Extra Heparinized Syringe[[id number]]                        Final result                 Please view results for these tests on the individual orders.   Extra Blue Top Tube   Result Value Ref Range    Hold Specimen JIC    Extra Red Top Tube   Result Value Ref Range    Hold Specimen JIC    Extra Green Top (Lithium Heparin) Tube   Result Value Ref Range    Hold Specimen JIC    Extra Purple Top Tube   Result Value Ref Range    Hold Specimen JIC    Extra Heparinized Syringe   Result Value Ref Range    Hold Specimen JIC    CBC with platelets differential    Narrative    The following orders were created for panel order CBC with platelets differential.  Procedure                               Abnormality         Status                      ---------                               -----------         ------                     CBC with platelets and d...[[id number]]                      Final result               RBC and Platelet Morphology[[id number]]  Abnormal            Final result                 Please view results for these tests on the individual orders.   Basic metabolic panel   Result Value Ref Range    Sodium 142 135 - 145 mmol/L    Potassium 3.3 (L) 3.4 - 5.3 mmol/L    Chloride 102 98 - 107 mmol/L    Carbon Dioxide (CO2) 30 (H) 22 - 29 mmol/L    Anion Gap 10 7 - 15 mmol/L    Urea Nitrogen 12.3 6.0 - 20.0 mg/dL    Creatinine 0.73 0.67 - 1.17 mg/dL    GFR Estimate >90 >60 mL/min/1.73m2    Calcium 8.7 8.6 - 10.0 mg/dL    Glucose 88 70 - 99 mg/dL   Hepatic function panel   Result Value Ref Range    Protein Total 7.3 6.4 - 8.3 g/dL    Albumin 4.6 3.5 - 5.2 g/dL    Bilirubin Total 0.5 <=1.2 mg/dL    Alkaline Phosphatase 50 40 - 150 U/L    AST 20 0 - 45 U/L    ALT 16 0 - 70 U/L    Bilirubin Direct <0.20 0.00 - 0.30 mg/dL   Lipase   Result Value Ref Range    Lipase 15 13 - 60 U/L   Lactic acid whole blood   Result Value Ref Range    Lactic Acid 1.5 0.7 - 2.0 mmol/L   CRP inflammation   Result Value Ref Range    CRP Inflammation <3.00 <5.00 mg/L   CBC with platelets and differential   Result Value Ref Range    WBC Count 7.0 4.0 - 11.0 10e3/uL    RBC Count 5.26 4.40 - 5.90 10e6/uL    Hemoglobin 15.6 13.3 - 17.7 g/dL    Hematocrit 45.0 40.0 - 53.0 %    MCV 86 78 - 100 fL    MCH 29.7 26.5 - 33.0 pg    MCHC 34.7 31.5 - 36.5 g/dL    RDW 12.0 10.0 - 15.0 %    Platelet Count 339 150 - 450 10e3/uL    % Neutrophils 46 %    % Lymphocytes 38 %    % Monocytes 13 %    % Eosinophils 2 %    % Basophils 0 %    % Immature Granulocytes 0 %    NRBCs per 100 WBC 0 <1 /100    Absolute Neutrophils 3.2 1.6 - 8.3 10e3/uL    Absolute Lymphocytes 2.6 0.8 - 5.3 10e3/uL    Absolute Monocytes 0.9 0.0 - 1.3 10e3/uL    Absolute Eosinophils 0.2 0.0 - 0.7 10e3/uL     Absolute Basophils 0.0 0.0 - 0.2 10e3/uL    Absolute Immature Granulocytes 0.0 <=0.4 10e3/uL    Absolute NRBCs 0.0 10e3/uL   RBC and Platelet Morphology   Result Value Ref Range    RBC Morphology Confirmed RBC Indices     Platelet Assessment  Automated Count Confirmed. Platelet morphology is normal.     Automated Count Confirmed. Platelet morphology is normal.    Giant Platelets      Acanthocytes      Jodi Rods      Basophilic Stippling      Bite Cells      Blister Cells      Gerry Cells      Elliptocytes      Hgb C Crystals      Eng-Jolly Bodies      Hypersegmented Neutrophils      Polychromasia      RBC agglutination      RBC Fragments      Reactive Lymphocytes Present (A) None Seen    Rouleaux      Sickle Cells      Smudge Cells      Spherocytes      Stomatocytes      Target Cells      Teardrop Cells      Toxic Neutrophils      Pathologist Review Comments (Blood)         Medications   ondansetron (ZOFRAN) injection 4 mg (4 mg Intravenous $Given 4/20/24 2315)   HYDROmorphone (PF) (DILAUDID) injection 0.5 mg (0.5 mg Intravenous $Given 4/20/24 2316)   potassium chloride rogerio ER (KLOR-CON M20) CR tablet 20 mEq (has no administration in time range)   sodium chloride 0.9% BOLUS 1,000 mL (0 mLs Intravenous Stopped 4/21/24 0015)   iopamidol (ISOVUE-370) solution 74 mL (74 mLs Intravenous $Given 4/20/24 2346)   sodium chloride (PF) 0.9% PF flush 50 mL (50 mLs Intravenous $Given 4/20/24 2346)       Assessments & Plan (with Medical Decision Making)     I have reviewed the nursing notes.    I have reviewed the findings, diagnosis, plan and need for follow up with the patient.          New Prescriptions    ONDANSETRON (ZOFRAN ODT) 4 MG ODT TAB    Take 1 tablet (4 mg) by mouth every 8 hours as needed for nausea       Final diagnoses:   Gastroenteritis   Hypokalemia       4/20/2024   [facility]       Ana Bush MD  04/21/24 0046       Ana Bush MD  04/21/24 0114

## 2024-07-07 ENCOUNTER — HEALTH MAINTENANCE LETTER (OUTPATIENT)
Age: 29
End: 2024-07-07

## 2024-11-15 ENCOUNTER — HOSPITAL ENCOUNTER (EMERGENCY)
Facility: HOSPITAL | Age: 29
Discharge: HOME | End: 2024-11-15
Attending: NURSE PRACTITIONER
Payer: COMMERCIAL

## 2024-11-15 VITALS
SYSTOLIC BLOOD PRESSURE: 119 MMHG | TEMPERATURE: 99.7 F | HEART RATE: 90 BPM | OXYGEN SATURATION: 99 % | RESPIRATION RATE: 16 BRPM | DIASTOLIC BLOOD PRESSURE: 68 MMHG

## 2024-11-15 DIAGNOSIS — H61.23 BILATERAL IMPACTED CERUMEN: Primary | ICD-10-CM

## 2024-11-15 DIAGNOSIS — J02.0 ACUTE STREPTOCOCCAL PHARYNGITIS: ICD-10-CM

## 2024-11-15 LAB — GROUP A STREP BY PCR: NOT DETECTED

## 2024-11-15 PROCEDURE — G0463 HOSPITAL OUTPT CLINIC VISIT: HCPCS

## 2024-11-15 PROCEDURE — 87651 STREP A DNA AMP PROBE: CPT | Performed by: NURSE PRACTITIONER

## 2024-11-15 PROCEDURE — 99213 OFFICE O/P EST LOW 20 MIN: CPT | Performed by: NURSE PRACTITIONER

## 2024-11-15 RX ORDER — AZITHROMYCIN 250 MG/1
TABLET, FILM COATED ORAL
Qty: 6 TABLET | Refills: 0 | Status: SHIPPED | OUTPATIENT
Start: 2024-11-15 | End: 2024-11-18

## 2024-11-15 ASSESSMENT — ENCOUNTER SYMPTOMS
FEVER: 0
CHILLS: 0
MYALGIAS: 0
PSYCHIATRIC NEGATIVE: 1
EYE DISCHARGE: 0
RHINORRHEA: 0
NAUSEA: 0
COUGH: 1
DIARRHEA: 0
EYE REDNESS: 0
ABDOMINAL PAIN: 0
HEADACHES: 1
TROUBLE SWALLOWING: 0
VOMITING: 0
NECK PAIN: 0
NECK STIFFNESS: 0
SORE THROAT: 1
SHORTNESS OF BREATH: 0

## 2024-11-15 ASSESSMENT — COLUMBIA-SUICIDE SEVERITY RATING SCALE - C-SSRS
6. HAVE YOU EVER DONE ANYTHING, STARTED TO DO ANYTHING, OR PREPARED TO DO ANYTHING TO END YOUR LIFE?: NO
2. HAVE YOU ACTUALLY HAD ANY THOUGHTS OF KILLING YOURSELF IN THE PAST MONTH?: NO
1. IN THE PAST MONTH, HAVE YOU WISHED YOU WERE DEAD OR WISHED YOU COULD GO TO SLEEP AND NOT WAKE UP?: NO

## 2024-11-15 ASSESSMENT — ACTIVITIES OF DAILY LIVING (ADL)
ADLS_ACUITY_SCORE: 0
ADLS_ACUITY_SCORE: 0

## 2024-11-15 NOTE — ED PROVIDER NOTES
History     Chief Complaint   Patient presents with    Headache     HPI  Kulwant Santillan is a 29 year old male who presents to urgent care today ambulatory with complaints of mild congestion, sore throat, mild cough and a headache which has been ongoing for the past 6 days.  No neck pain or stiffness.  Denies any fever, chills, nausea, vomiting, diarrhea, shortness of breath or chest pain.  No rashes.  Staying hydrated.  No OTC meds, declines any medication in urgent care for pain.  No other concerns.    Allergies:  Allergies   Allergen Reactions    Cefprozil     Penicillins     Sulfa Antibiotics      Sulfonamide antibiotics    Sulfamethoxazole     Trimethoprim     Amoxicillin Trihydrate Rash    Clavulanic Acid Potassium Rash       Problem List:    Patient Active Problem List    Diagnosis Date Noted    Loose body of right knee 08/18/2020     Priority: Medium     Added automatically from request for surgery [id number]      Knee joint hypermobility 10/04/2017     Priority: Medium    ACP (advance care planning) 07/07/2016     Priority: Medium     Advance Care Planning 7/7/2016: ACP Review of Chart / Resources Provided:  Reviewed chart for advance care plan.  Kulwant Santillan has no plan or code status on file. Discussed available resources and declined information. Confirmed code status reflects current choices pending further ACP discussions.  Confirmed/documented legally designated decision makers.  Added by Priti Pearson 06/19/2014     Priority: Medium        Past Medical History:    No past medical history on file.    Past Surgical History:    Past Surgical History:   Procedure Laterality Date    ARTHROSCOPY KNEE WITH DEBRIDEMENT JOINT, COMBINED Left 6/22/2018    Procedure: ARTHROSCOPY KNEE WITH DEBRIDEMENT JOINT;  Examination Under Anesthesia Left Knee, Left Knee Arthroscopy, Chondroplasty, Loose Body Removal  ;  Surgeon: Hoang Castro MD;  Location: [facility]    ARTHROSCOPY KNEE WITH  DEBRIDEMENT JOINT, COMBINED Right 9/29/2020    Procedure: Right knee Examination under anesthesia, knee arthroscopy, chondroplasty, loosy body removal;  Surgeon: Hoang Castro MD;  Location: [facility]    CIRCUMCISION      ORTHOPEDIC SURGERY  9-2013    rt knee, open, tendon repair       Family History:    Family History   Problem Relation Age of Onset    Cancer Mother         nasal pharngeal    Thyroid Disease Mother         disease       Social History:  Marital Status:  Single [1]  Social History     Tobacco Use    Smoking status: Former    Smokeless tobacco: Never    Tobacco comments:     no passive exposure   Substance Use Topics    Alcohol use: No    Drug use: No        Medications:    azithromycin (ZITHROMAX Z-KELECHI) 250 MG tablet  ondansetron (ZOFRAN ODT) 4 MG ODT tab      Review of Systems   Constitutional:  Negative for chills and fever.   HENT:  Positive for congestion and sore throat. Negative for ear pain, rhinorrhea and trouble swallowing.    Eyes:  Negative for discharge and redness.   Respiratory:  Positive for cough (mild). Negative for shortness of breath.    Cardiovascular:  Negative for chest pain.   Gastrointestinal:  Negative for abdominal pain, diarrhea, nausea and vomiting.   Genitourinary:  Negative for decreased urine volume.   Musculoskeletal:  Negative for gait problem, myalgias, neck pain and neck stiffness.   Skin:  Negative for rash.   Neurological:  Positive for headaches.   Psychiatric/Behavioral: Negative.       Physical Exam   BP: 119/68  Pulse: 90  Temp: 99.7  F (37.6  C)  Resp: 16  SpO2: 99 %    Physical Exam  Vitals and nursing note reviewed.   Constitutional:       General: He is not in acute distress.     Appearance: Normal appearance. He is not ill-appearing or toxic-appearing.   HENT:      Right Ear: There is impacted cerumen.      Left Ear: There is impacted cerumen.      Nose: Congestion present. No rhinorrhea.      Mouth/Throat:      Mouth: Mucous membranes are  moist.      Pharynx: Oropharynx is clear. Posterior oropharyngeal erythema present.      Tonsils: Tonsillar exudate present. No tonsillar abscesses. 1+ on the right. 1+ on the left.   Cardiovascular:      Rate and Rhythm: Normal rate and regular rhythm.      Pulses: Normal pulses.      Heart sounds: Normal heart sounds.   Pulmonary:      Effort: Pulmonary effort is normal.      Breath sounds: Normal breath sounds.   Abdominal:      General: Bowel sounds are normal.      Palpations: Abdomen is soft.      Tenderness: There is no abdominal tenderness.   Musculoskeletal:      Cervical back: Normal range of motion and neck supple. No rigidity or tenderness.   Lymphadenopathy:      Cervical: Cervical adenopathy present.   Skin:     General: Skin is warm and dry.      Capillary Refill: Capillary refill takes less than 2 seconds.   Neurological:      Mental Status: He is alert.   Psychiatric:         Mood and Affect: Mood normal.       ED Course     Procedures    Results for orders placed or performed during the hospital encounter of 11/15/24 (from the past 24 hours)   Group A Streptococcus PCR Throat Swab    Specimen: Throat; Swab   Result Value Ref Range    Group A strep by PCR Not Detected Not Detected    Narrative    The Xpert Xpress Strep A test, performed on the [employer], is a rapid, qualitative in vitro diagnostic test for the detection of Streptococcus pyogenes (Group A ß-hemolytic Streptococcus, Strep A) in throat swab specimens from patients with signs and symptoms of pharyngitis. The Xpert Xpress Strep A test can be used as an aid in the diagnosis of Group A Streptococcal pharyngitis. The assay is not intended to monitor treatment for Group A Streptococcus infections. The Xpert Xpress Strep A test utilizes an automated real-time polymerase chain reaction (PCR) to detect Streptococcus pyogenes DNA.       Medications - No data to display    Assessments & Plan (with Medical Decision Making)      I have reviewed the nursing notes.    I have reviewed the findings, diagnosis, plan and need for follow up with the patient.  (H61.23) Bilateral impacted cerumen  (primary encounter diagnosis)  Plan: Adult ENT  Referral  (J02.0) Acute streptococcal pharyngitis  Plan:   Patient ambulatory with a nontoxic appearance.  Lungs clear throughout.  Impacted cerumen bilateral, attempted to flush and patient unable to tolerate ear flushing.  Attempted to use curette and patient did not tolerate that either.  Denies any ear pain or drainage.  Patient states he has had to go to ENT in the past for cerumen removal.  Throat erythema, tonsillar exudate, no signs of peritonsillar abscess, cervical adenopathy present.  No neck pain or stiffness, no spinal tenderness.  Strep test negative, reviewed risk versus benefits of antibiotics, symptoms consistent with strep, will treat with azithromycin, patient has multiple allergies and says azithromycin works well for him.  Declines any lab workup today.  Declines any COVID testing.  Patient to take azithromycin as ordered.  Alternate Tylenol and ibuprofen as needed for pain.  Warm salt gargles or honey as needed for sore throat.  Schedule follow-up appointment with ENT for cerumen removal.  Follow-up with primary care provider or return to urgent care/ED with any worsening in condition or additional concerns.  Patient in agreement treatment plan.    Discharge Medication List as of 11/15/2024  1:47 PM        START taking these medications    Details   azithromycin (ZITHROMAX Z-KELECHI) 250 MG tablet Two tablets on the first day, then one tablet daily for the next 4 days, Disp-6 tablet, R-0, E-Prescribe           Final diagnoses:   Bilateral impacted cerumen   Acute streptococcal pharyngitis     11/15/2024   [facility]       Sarah Rene NP  11/15/24 [id number]

## 2024-11-15 NOTE — ED TRIAGE NOTES
JO ANN Castellanos CNP assessed patient in triage and determined patient Urgent Care appropriate. Will be seen in Urgent Care.

## 2024-11-15 NOTE — ED TRIAGE NOTES
PT presents today with c/o headache for 6 day. Also states he has some swelling on left side of neck. Pt has spots on back of throat same side as lumps on neck.

## 2024-11-15 NOTE — DISCHARGE INSTRUCTIONS
ENT referral placed for follow up regarding impacted earwax     Azithromycin as ordered  Switch out toothbrush 24 hours after starting antibiotic    Symptomatic treatments recommended.  - Ensure you are staying hydrated by drinking plenty of fluids or eating foods such as popsicles, jello, pudding.  - Honey can be soothing for sore throat  - Warm salt water gurgles can help soothe sore throat  - Rest  - Humidifier can help with congestion and help keep mucus membranes such as throat and nose from drying out.  - Sleeping slightly propped up can help with congestion and postnasal drainage that can worsen cough at bedtime.  - As long as you have never been told to take Tylenol and/or Ibuprofen you can use them to manage fever and body aches per package instructions  Make sure you eat when you take ibuprofen to avoid stomach upset.  - OTC cough medications per package instructions to help with cough. Check to see if the cough/cold medication already has acetaminophen (Tylenol) in it. If it does avoid taking additional Tylenol.  - If sudden onset of new fever, worsening symptoms return for further evaluation.  - OTC nasal steroid such as Flonase can help decrease sinus inflammation to help with congestion.    Follow-up with primary care provider or return to urgent care/ED with any worsening in condition or additional concerns.

## 2024-11-18 ENCOUNTER — HOSPITAL ENCOUNTER (EMERGENCY)
Facility: HOSPITAL | Age: 29
Discharge: HOME | End: 2024-11-18
Payer: COMMERCIAL

## 2024-11-18 VITALS
SYSTOLIC BLOOD PRESSURE: 99 MMHG | OXYGEN SATURATION: 97 % | WEIGHT: 148 LBS | BODY MASS INDEX: 18.5 KG/M2 | RESPIRATION RATE: 16 BRPM | HEART RATE: 88 BPM | DIASTOLIC BLOOD PRESSURE: 65 MMHG | TEMPERATURE: 97.7 F

## 2024-11-18 DIAGNOSIS — J02.0 STREP PHARYNGITIS: ICD-10-CM

## 2024-11-18 PROCEDURE — 99213 OFFICE O/P EST LOW 20 MIN: CPT

## 2024-11-18 PROCEDURE — G0463 HOSPITAL OUTPT CLINIC VISIT: HCPCS

## 2024-11-18 PROCEDURE — 250N000009 HC RX 250

## 2024-11-18 RX ORDER — DEXAMETHASONE SODIUM PHOSPHATE 10 MG/ML
16 INJECTION INTRAMUSCULAR; INTRAVENOUS ONCE
Status: COMPLETED | OUTPATIENT
Start: 2024-11-18 | End: 2024-11-18

## 2024-11-18 RX ORDER — CLINDAMYCIN HYDROCHLORIDE 300 MG/1
300 CAPSULE ORAL 3 TIMES DAILY
Qty: 30 CAPSULE | Refills: 0 | Status: SHIPPED | OUTPATIENT
Start: 2024-11-18 | End: 2024-11-28

## 2024-11-18 RX ORDER — SACCHAROMYCES BOULARDII 250 MG
250 CAPSULE ORAL 2 TIMES DAILY
Qty: 28 CAPSULE | Refills: 0 | Status: SHIPPED | OUTPATIENT
Start: 2024-11-18 | End: 2024-12-02

## 2024-11-18 RX ADMIN — DEXAMETHASONE SODIUM PHOSPHATE 16 MG: 10 INJECTION INTRAMUSCULAR; INTRAVENOUS at 13:47

## 2024-11-18 ASSESSMENT — ENCOUNTER SYMPTOMS
VOMITING: 0
ACTIVITY CHANGE: 1
DIARRHEA: 0
FEVER: 1
TROUBLE SWALLOWING: 1
NAUSEA: 0
COUGH: 0
SORE THROAT: 1

## 2024-11-18 NOTE — DISCHARGE INSTRUCTIONS
Stop the azithromycin. Start taking clindamycin 3 times daily for 10 days. Change toothbrush 48 hours after starting antibiotics. Probiotics 2 times daily.     Follow up in the clinic for a recheck.   Return to the ED with any difficulty swallowing, difficulty opening mouth, failed improvement in 48 hours, or other new or concerning symptoms.

## 2024-11-18 NOTE — Clinical Note
Kulwant Santillan was seen and treated in our emergency department on 11/18/2024.  He may return to work on 11/21/2024.       If you have any questions or concerns, please don't hesitate to call.      Guerita Castellanos, NP

## 2024-11-18 NOTE — ED TRIAGE NOTES
Pt presents with sore throat and migraine that started 10 days ago. Pt states has been taking abx for three days and feels like symptoms are worsening.

## 2024-11-18 NOTE — ED PROVIDER NOTES
History     Chief Complaint   Patient presents with    Pharyngitis     HPI  Kulwant Santillan is a 29 year old male who present to the urgent care with complaints of a sore throat. Dx with strep on 11/15. Has taken 3 days of azithromycin without improvement. He denies fully opening mouth, drooling, and n/v/d. Decreased oral intake due to pain. No OTC medications PTA.     Allergies:  Allergies   Allergen Reactions    Cefprozil     Penicillins     Sulfa Antibiotics      Sulfonamide antibiotics    Sulfamethoxazole     Trimethoprim     Amoxicillin Trihydrate Rash    Clavulanic Acid Potassium Rash       Problem List:    Patient Active Problem List    Diagnosis Date Noted    Loose body of right knee 08/18/2020     Priority: Medium     Added automatically from request for surgery [id number]      Knee joint hypermobility 10/04/2017     Priority: Medium    ACP (advance care planning) 07/07/2016     Priority: Medium     Advance Care Planning 7/7/2016: ACP Review of Chart / Resources Provided:  Reviewed chart for advance care plan.  Kulwant Santillan has no plan or code status on file. Discussed available resources and declined information. Confirmed code status reflects current choices pending further ACP discussions.  Confirmed/documented legally designated decision makers.  Added by Priti Pearson 06/19/2014     Priority: Medium        Past Medical History:    No past medical history on file.    Past Surgical History:    Past Surgical History:   Procedure Laterality Date    ARTHROSCOPY KNEE WITH DEBRIDEMENT JOINT, COMBINED Left 6/22/2018    Procedure: ARTHROSCOPY KNEE WITH DEBRIDEMENT JOINT;  Examination Under Anesthesia Left Knee, Left Knee Arthroscopy, Chondroplasty, Loose Body Removal  ;  Surgeon: Hoang Castro MD;  Location:  OR    ARTHROSCOPY KNEE WITH DEBRIDEMENT JOINT, COMBINED Right 9/29/2020    Procedure: Right knee Examination under anesthesia, knee arthroscopy, chondroplasty, loosy body  removal;  Surgeon: Hoang Castro MD;  [facility]    CIRCUMCISION      ORTHOPEDIC SURGERY  9-2013    rt knee, open, tendon repair       Family History:    Family History   Problem Relation Age of Onset    Cancer Mother         nasal pharngeal    Thyroid Disease Mother         disease       Social History:  Marital Status:  Single [1]  Social History     Tobacco Use    Smoking status: Former    Smokeless tobacco: Never    Tobacco comments:     no passive exposure   Substance Use Topics    Alcohol use: No    Drug use: No        Medications:    clindamycin (CLEOCIN) 300 MG capsule  ondansetron (ZOFRAN ODT) 4 MG ODT tab  saccharomyces boulardii (FLORASTOR) 250 MG capsule          Review of Systems   Constitutional:  Positive for activity change and fever.   HENT:  Positive for sore throat and trouble swallowing (due to pain). Negative for drooling.    Respiratory:  Negative for cough.    Gastrointestinal:  Negative for diarrhea, nausea and vomiting.   All other systems reviewed and are negative.      Physical Exam   BP: 99/65  Pulse: 88  Temp: 97.7  F (36.5  C)  Resp: 16  Weight: 67.1 kg (148 lb)  SpO2: 97 %      Physical Exam  Vitals and nursing note reviewed.   Constitutional:       General: He is not in acute distress.     Appearance: He is well-developed. He is ill-appearing. He is not toxic-appearing.   HENT:      Right Ear: Tympanic membrane is not erythematous.      Left Ear: Tympanic membrane is not erythematous.      Mouth/Throat:      Mouth: Mucous membranes are moist.      Pharynx: Uvula midline. Oropharyngeal exudate and posterior oropharyngeal erythema present. No uvula swelling.      Tonsils: Tonsillar exudate present. No tonsillar abscesses. 1+ on the right. 2+ on the left.   Cardiovascular:      Rate and Rhythm: Normal rate and regular rhythm.      Heart sounds: No murmur heard.  Pulmonary:      Effort: Pulmonary effort is normal.      Breath sounds: Normal breath sounds. No wheezing,  rhonchi or rales.   Lymphadenopathy:      Cervical: Cervical adenopathy present.   Skin:     General: Skin is warm and dry.      Capillary Refill: Capillary refill takes less than 2 seconds.   Neurological:      General: No focal deficit present.      Mental Status: He is alert and oriented to person, place, and time.   Psychiatric:         Mood and Affect: Mood normal.         Behavior: Behavior normal.         ED Course        Procedures    No results found for this or any previous visit (from the past 24 hours).    Medications   dexAMETHasone (DECADRON) injectable solution used ORALLY 16 mg (16 mg Oral $Given 11/18/24 1347)       Assessments & Plan (with Medical Decision Making)     I have reviewed the nursing notes.    I have reviewed the findings, diagnosis, plan and need for follow up with the patient.  Kulwant Santlilan is a 29 year old male who present to the urgent care with complaints of a sore throat. Dx with strep on 11/15. Has taken 3 days of azithromycin without improvement. He denies fully opening mouth, drooling, and n/v/d. Decreased oral intake due to pain. Ibuprofen this AM.    MDM: vital signs normal, afebrile. Non toxic in appearance with no noted distress. Able to speak in complete sentences without dyspnea. Lungs clear, heart tones regular. Erythema and exudate to posterior oropharynx. Left tonsils 2+, right 1-2+. Uvula midline. No trismus. Drooling, or visible peritonsillar abscess. Will change to clindamycin. Probiotics also prescribed. Decadron given in the UC to help with pain tonsillar hypertrophy. Strict return precautions along with supportive measures discussed. He is in agreement with plan.     (J02.0) Strep pharyngitis  Plan: Stop the azithromycin. Start taking clindamycin 3 times daily for 10 days. Change toothbrush 48 hours after starting antibiotics. Probiotics 2 times daily.     Follow up in the clinic for a recheck.   Return to the ED with any difficulty swallowing, difficulty  opening mouth, failed improvement in 48 hours, or other new or concerning symptoms. Understanding verbalized.       Discharge Medication List as of 11/18/2024  1:47 PM        START taking these medications    Details   clindamycin (CLEOCIN) 300 MG capsule Take 1 capsule (300 mg) by mouth 3 times daily for 10 days., Disp-30 capsule, R-0, E-Prescribe      saccharomyces boulardii (FLORASTOR) 250 MG capsule Take 1 capsule (250 mg) by mouth 2 times daily for 14 days., Disp-28 capsule, R-0, E-Prescribe             Final diagnoses:   Strep pharyngitis       11/18/2024   [facility]       Guerita Castellanos, NP  11/18/24 [id number]

## 2024-11-21 ENCOUNTER — TELEPHONE (OUTPATIENT)
Dept: FAMILY MEDICINE | Facility: OTHER | Age: 29
End: 2024-11-21

## 2024-11-21 NOTE — TELEPHONE ENCOUNTER
Symptom or reason needing to speak to RN: ER F/U - SWOLLEN TONSILS - BAD HEADACHE FOR 10 DAYS - INCREDIBLY WEAK CAN'T EAT OR DRINK      Best number to return call: [phone]     Best time to return call: ANY

## 2024-11-21 NOTE — TELEPHONE ENCOUNTER
Date of ER/UC Visit:  11/15/24 and 11/18/24    Location: [facility]    Reason for ER/UC Visit:    11/15/24- bilateral impacted cerumen, acute streptococcal pharyngitis  11/18/24- strep pharyngitis     Medication Changes:    11/15/24-  START taking these medications     Details   azithromycin (ZITHROMAX Z-KELECHI) 250 MG tablet Two tablets on the first day, then one tablet daily for the next 4 days, Disp-6 tablet, R-0, E-Prescribe           11/18/24-  START taking these medications     Details   clindamycin (CLEOCIN) 300 MG capsule Take 1 capsule (300 mg) by mouth 3 times daily for 10 days., Disp-30 capsule, R-0, E-Prescribe       saccharomyces boulardii (FLORASTOR) 250 MG capsule Take 1 capsule (250 mg) by mouth 2 times daily for 14 days., Disp-28 capsule, R-0, E-Prescribe       Medication picked up/started: Yes      Symptoms improved or worsened: Tonsils are swollen, no appetite, weak-laying in bed    ER/UC follow up recommended:    Per 11/18/24-  Follow up in the clinic for a recheck.   Return to the ED with any difficulty swallowing, difficulty opening mouth, failed improvement in 48 hours, or other new or concerning symptoms. Understanding verbalized.     Questions/concern: as noted above in symptoms    Appointment Scheduled:      Patient advised to return to ED since no improvement per discharge instructions.

## 2025-07-13 ENCOUNTER — HEALTH MAINTENANCE LETTER (OUTPATIENT)
Age: 30
End: 2025-07-13

## (undated) DEVICE — LINEN DRAPE 54X72" 5467

## (undated) DEVICE — SU ETHILON 3-0 PS-1 18" 1663H

## (undated) DEVICE — SOL NACL 0.9% IRRIG 3000ML BAG 2B7477

## (undated) DEVICE — PAD ARMBOARD FOAM EGGCRATE COVIDEN 3114367

## (undated) DEVICE — LINEN TOWEL PACK X5 5464

## (undated) DEVICE — GLOVE PROTEXIS POWDER FREE SMT 8.0  2D72PT80X

## (undated) DEVICE — SUCTION MANIFOLD NEPTUNE 2 SYS 4 PORT 0702-020-000

## (undated) DEVICE — PREP DURAPREP 26ML APL 8630

## (undated) DEVICE — BUR ARTHREX COOLCUT SABRE 4.0MMX13CM AR-8400SR

## (undated) DEVICE — TUBING SYSTEM ARTHREX PATIENT REDEUCE AR-6421

## (undated) DEVICE — PACK ARTHROSCOPY CUSTOM ASC

## (undated) DEVICE — GLOVE PROTEXIS BLUE W/NEU-THERA 8.0  2D73EB80

## (undated) RX ORDER — FENTANYL CITRATE 50 UG/ML
INJECTION, SOLUTION INTRAMUSCULAR; INTRAVENOUS
Status: DISPENSED
Start: 2020-09-29

## (undated) RX ORDER — KETOROLAC TROMETHAMINE 30 MG/ML
INJECTION, SOLUTION INTRAMUSCULAR; INTRAVENOUS
Status: DISPENSED
Start: 2020-09-29

## (undated) RX ORDER — OXYCODONE HYDROCHLORIDE 5 MG/1
TABLET ORAL
Status: DISPENSED
Start: 2018-06-22

## (undated) RX ORDER — GLYCOPYRROLATE 0.2 MG/ML
INJECTION INTRAMUSCULAR; INTRAVENOUS
Status: DISPENSED
Start: 2020-09-29

## (undated) RX ORDER — EPHEDRINE SULFATE 50 MG/ML
INJECTION, SOLUTION INTRAMUSCULAR; INTRAVENOUS; SUBCUTANEOUS
Status: DISPENSED
Start: 2020-09-29

## (undated) RX ORDER — KETOROLAC TROMETHAMINE 30 MG/ML
INJECTION, SOLUTION INTRAMUSCULAR; INTRAVENOUS
Status: DISPENSED
Start: 2018-06-22

## (undated) RX ORDER — EPHEDRINE SULFATE 50 MG/ML
INJECTION, SOLUTION INTRAMUSCULAR; INTRAVENOUS; SUBCUTANEOUS
Status: DISPENSED
Start: 2018-06-22

## (undated) RX ORDER — DEXAMETHASONE SODIUM PHOSPHATE 4 MG/ML
INJECTION, SOLUTION INTRA-ARTICULAR; INTRALESIONAL; INTRAMUSCULAR; INTRAVENOUS; SOFT TISSUE
Status: DISPENSED
Start: 2020-09-29

## (undated) RX ORDER — ACETAMINOPHEN 325 MG/1
TABLET ORAL
Status: DISPENSED
Start: 2020-09-29

## (undated) RX ORDER — PROPOFOL 10 MG/ML
INJECTION, EMULSION INTRAVENOUS
Status: DISPENSED
Start: 2018-06-22

## (undated) RX ORDER — GABAPENTIN 300 MG/1
CAPSULE ORAL
Status: DISPENSED
Start: 2020-09-29

## (undated) RX ORDER — PROPOFOL 10 MG/ML
INJECTION, EMULSION INTRAVENOUS
Status: DISPENSED
Start: 2020-09-29

## (undated) RX ORDER — ACETAMINOPHEN 325 MG/1
TABLET ORAL
Status: DISPENSED
Start: 2018-06-22

## (undated) RX ORDER — FENTANYL CITRATE-0.9 % NACL/PF 10 MCG/ML
PLASTIC BAG, INJECTION (ML) INTRAVENOUS
Status: DISPENSED
Start: 2020-09-29

## (undated) RX ORDER — FENTANYL CITRATE 50 UG/ML
INJECTION, SOLUTION INTRAMUSCULAR; INTRAVENOUS
Status: DISPENSED
Start: 2018-06-22

## (undated) RX ORDER — LIDOCAINE HYDROCHLORIDE 20 MG/ML
INJECTION, SOLUTION EPIDURAL; INFILTRATION; INTRACAUDAL; PERINEURAL
Status: DISPENSED
Start: 2018-06-22

## (undated) RX ORDER — CLINDAMYCIN PHOSPHATE 900 MG/50ML
INJECTION, SOLUTION INTRAVENOUS
Status: DISPENSED
Start: 2020-09-29

## (undated) RX ORDER — BUPIVACAINE HYDROCHLORIDE 2.5 MG/ML
INJECTION, SOLUTION EPIDURAL; INFILTRATION; INTRACAUDAL
Status: DISPENSED
Start: 2018-06-22

## (undated) RX ORDER — LIDOCAINE HYDROCHLORIDE 20 MG/ML
INJECTION, SOLUTION EPIDURAL; INFILTRATION; INTRACAUDAL; PERINEURAL
Status: DISPENSED
Start: 2020-09-29

## (undated) RX ORDER — EPINEPHRINE 1 MG/ML
INJECTION, SOLUTION, CONCENTRATE INTRAVENOUS
Status: DISPENSED
Start: 2020-09-29

## (undated) RX ORDER — HYDROXYZINE HYDROCHLORIDE 25 MG/1
TABLET, FILM COATED ORAL
Status: DISPENSED
Start: 2018-06-22

## (undated) RX ORDER — ONDANSETRON 2 MG/ML
INJECTION INTRAMUSCULAR; INTRAVENOUS
Status: DISPENSED
Start: 2020-09-29

## (undated) RX ORDER — BUPIVACAINE HYDROCHLORIDE 2.5 MG/ML
INJECTION, SOLUTION EPIDURAL; INFILTRATION; INTRACAUDAL
Status: DISPENSED
Start: 2020-09-29

## (undated) RX ORDER — ONDANSETRON 2 MG/ML
INJECTION INTRAMUSCULAR; INTRAVENOUS
Status: DISPENSED
Start: 2018-06-22

## (undated) RX ORDER — DEXAMETHASONE SODIUM PHOSPHATE 4 MG/ML
INJECTION, SOLUTION INTRA-ARTICULAR; INTRALESIONAL; INTRAMUSCULAR; INTRAVENOUS; SOFT TISSUE
Status: DISPENSED
Start: 2018-06-22

## (undated) RX ORDER — GABAPENTIN 300 MG/1
CAPSULE ORAL
Status: DISPENSED
Start: 2018-06-22